# Patient Record
(demographics unavailable — no encounter records)

---

## 2024-11-18 NOTE — PHYSICAL EXAM
[Normal Appearance] : normal appearance [Well Groomed] : well groomed [General Appearance - In No Acute Distress] : no acute distress [Edema] : no peripheral edema [Respiration, Rhythm And Depth] : normal respiratory rhythm and effort [Exaggerated Use Of Accessory Muscles For Inspiration] : no accessory muscle use [] : no rash [Affect] : the affect was normal [Mood] : the mood was normal [de-identified] : 16 f velarde in place [de-identified] : in wheelchair [de-identified] : lethargic

## 2024-11-18 NOTE — ASSESSMENT
[FreeTextEntry1] : 1- leg bag and gravit bag discussed and given to change at [facility] 2- to cont rehab 3- rtc for cath change and UDS same michael - kenji if more active wht PT/rehab

## 2024-11-18 NOTE — HISTORY OF PRESENT ILLNESS
[FreeTextEntry1] : Hosp Course ( nov 12, d/c) CT Head w/wo IV contrast and MR Head+C-spine+T-spine showed no acute findings. Noted with leukocytosis and left shift, CT A/P with IV contrast showed sacral abscess - too small to drain per Interventional Radiology. Infectious Disease was consulted, patient treated with IV Zosyn, will transition to IV ertapenem upon discharge until 11/20/24. Midline IV placed 11/09/24. Psych and neurology consulted for encephalopathy, no acute intervention - felt her decline is not related to MS or a mood disorder. Noted with YANET, secondary to urinary retention/obstructive uropathy which improved with Cole placement. VA Duplex lower ext. vein scan unremarkable. Patient failed TOV, will be discharged with indwelling urinary catheter in place. PT evaluated patient and recommended [facility]. Discharge plan with medication reconciliation discussed with attending Dr. Castillo. Patient is medically cleared for discharge to [facility] with IV antibiotics and indwelling urinary catheter.  Nov 2024: creat 1.16, urine and blood cx : negative CT SCAN (+): 3 mm left renal stone   currently in Rehab ( now one week ) here with  for possibley 3-4 weeks was INC  wiht frequency increased for past 6-8 week before hosp , diapper for one year wiht bedside commode  bowel habits wiht INC -BM now in diaper and sacral wound be treated with visint nurse  admits to dec fluid intake

## 2025-01-02 NOTE — PHYSICAL EXAM
[General Appearance - Alert] : alert [General Appearance - In No Acute Distress] : in no acute distress [Auscultation Breath Sounds / Voice Sounds] : lungs were clear to auscultation bilaterally [Heart Rate And Rhythm] : heart rate was normal and rhythm regular [Heart Sounds] : normal S1 and S2 [Heart Sounds Gallop] : no gallops [Murmurs] : no murmurs [Heart Sounds Pericardial Friction Rub] : no pericardial rub [Bowel Sounds] : normal bowel sounds [Abdomen Soft] : soft [Abdomen Tenderness] : non-tender [] : no hepato-splenomegaly [Abdomen Mass (___ Cm)] : no abdominal mass palpated [Costovertebral Angle Tenderness] : no CVA tenderness [Musculoskeletal - Swelling] : no joint swelling [Nail Clubbing] : no clubbing  or cyanosis of the fingernails [Motor Tone] : muscle strength and tone were normal [Deep Tendon Reflexes (DTR)] : deep tendon reflexes were 2+ and symmetric [Sensation] : the sensory exam was normal to light touch and pinprick [No Focal Deficits] : no focal deficits [Oriented To Time, Place, And Person] : oriented to person, place, and time [Affect] : the affect was normal

## 2025-01-02 NOTE — ASSESSMENT
[FreeTextEntry1] : Ms. Jadyn Giles Is a 64-year-old female who is being seen in ID clinic after recent hospitalization in November 2024.  Patient completed antibiotic course on 11/20/2024.  Had a pelvic abscess noted on CT scan in the hospital without drainage.  Will order repeat CT abdomen/pelvis to assess for resolution.  Advised for aggressive wound care.  Patient with nonspecific general malaise symptoms today reportedly for a few weeks.  No fevers or chills.  Will obtain CBC, CMP, blood cultures x 2 for surveillance.  No indication for antibiotics today.  Advised to monitor for fever, chills, worsening other symptoms..  Advised to call clinic if these develop.  Low threshold for hospitalization if patient becomes altered with worsening signs of infection.  Jann Kate MD Division of Infectious Diseases

## 2025-01-02 NOTE — HISTORY OF PRESENT ILLNESS
[FreeTextEntry1] : Ms. Jadyn Giles Is a 64-year-old female who is being seen in ID clinic after recent hospitalization in November 2024.  Patient was discharged with a plan of 14 days of antibiotics consisting of ertapenem at discharge, completed course on 11/20/2024.    Since discharge, patient patient initially was feeling better however now reports 3 weeks of general malaise.  No other systemic symptoms such as fever, chills.  Denies any other localizing symptoms aside from an occasional cough.  Patient currently remains in rehab.  Does undergo regular wound care for sacral wound.  Has been also reports that patient had a positive MRSA screen recently.  States did receive antibiotics however records are unclear.

## 2025-04-01 NOTE — DISCUSSION/SUMMARY
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 #3 HTN- c/w amlodipine 10mg, lisinopril 40mg, metoprolol 100mg bid #4 HLD- c/w ezetimibe 10mg #5 DM- on metformin 1000/500mg #6 Neuro- MS mostly bedbound and wheelchair with assistance, new medication pending, gabapentin, autonomic dysfunction may be contributing as well, compression stockings, ID appt.  #7 Anxiety- on escitalopram 20mg, Glucerna #8 ID- bed sore regularly evaluated and treatment. #9 GI- GERD on omeprazole [EKG obtained to assist in diagnosis and management of assessed problem(s)] : EKG obtained to assist in diagnosis and management of assessed problem(s)

## 2025-04-01 NOTE — HISTORY OF PRESENT ILLNESS
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 ? new med for MS. Declining and several hospitalizations now in nursing home. DNR

## 2025-04-01 NOTE — PHYSICAL EXAM
[Well Developed] : well developed [Well Nourished] : well nourished [No Acute Distress] : no acute distress [Normal Conjunctiva] : normal conjunctiva [Normal Venous Pressure] : normal venous pressure [No Carotid Bruit] : no carotid bruit [Normal S1, S2] : normal S1, S2 [No Murmur] : no murmur [No Rub] : no rub [No Gallop] : no gallop [Clear Lung Fields] : clear lung fields [Good Air Entry] : good air entry [No Respiratory Distress] : no respiratory distress  [Soft] : abdomen soft [Non Tender] : non-tender [No Masses/organomegaly] : no masses/organomegaly [Normal Bowel Sounds] : normal bowel sounds [No Edema] : no edema [No Cyanosis] : no cyanosis [No Clubbing] : no clubbing [No Varicosities] : no varicosities [No Rash] : no rash [No Skin Lesions] : no skin lesions [Moves all extremities] : moves all extremities [No Focal Deficits] : no focal deficits [Normal Speech] : normal speech [Alert and Oriented] : alert and oriented [Normal memory] : normal memory [de-identified] : wheelchair

## 2025-04-01 NOTE — PHYSICAL EXAM
[Well Developed] : well developed [Well Nourished] : well nourished [No Acute Distress] : no acute distress [Normal Conjunctiva] : normal conjunctiva [Normal Venous Pressure] : normal venous pressure [No Carotid Bruit] : no carotid bruit [Normal S1, S2] : normal S1, S2 [No Murmur] : no murmur [No Rub] : no rub [No Gallop] : no gallop [Clear Lung Fields] : clear lung fields [Good Air Entry] : good air entry [No Respiratory Distress] : no respiratory distress  [Soft] : abdomen soft [Non Tender] : non-tender [No Masses/organomegaly] : no masses/organomegaly [Normal Bowel Sounds] : normal bowel sounds [No Edema] : no edema [No Cyanosis] : no cyanosis [No Clubbing] : no clubbing [No Varicosities] : no varicosities [No Rash] : no rash [No Skin Lesions] : no skin lesions [Moves all extremities] : moves all extremities [No Focal Deficits] : no focal deficits [Normal Speech] : normal speech [Alert and Oriented] : alert and oriented [Normal memory] : normal memory [de-identified] : wheelchair

## 2025-04-30 NOTE — ASSESSMENT
[FreeTextEntry1] : Pressure injury of sacral region, stage 4 (707.03,707.24) (L89.154) 7-29-22: Pt accompanied by  Pt has home care in place.  also changes wound dressing Pt here for stage 4 sacral wound which has been taken care of at [facility] and was almost healed but got bigger with recent hospitalization Patient's  reports small amount of drainage on the bandage No drainage from leg On exam: Rt lower leg with scar and hypopigmented tissue evidence of recent epithelialization. No open wound Sacrum: wound with periwound maceration and epiboly with granulation tissue centrally No s/s of infection Chemical cauterization with silver nitrate to sacral wound  8-24-22: Pt here for f/u Accompanied by  who does wound care No new complaints.  has concerns that buttocks breaking down. Pt incontinent On exam: faint hyperpigmentation to BL buttocks sacral wound clean, appears smaller. epiboly present silver nitrate to epiboly No s/s of infection underminig 12:00 - 6:00 1cm, 9:00 0.5cm  9-15-22 Patient here for f/u of sacral wound, accompanied by . - DTI developing in R lateral malleolar and B/L upper buttock/lower sacral regions - limited to blanching lividity at this point. Discussion addressing importance of offloading pressure from these regions was had with the patient and her . Given her sleeping position (supine), the possibility of ordering a Group 2 hospital mattress was discussed, but they will defer for now. Alternatively, patient's  was encouraged to change her position (alternating between lying on her sides) every few hours at least with support of a pillow. - Sacral wound: Good granulation tissue present in wound base, overlying sacral bone (no exposure). No local or systemic signs/symptoms of infection. No purulent discharge, no blanching erythema, no malodor, no crepitus or bullae formation. Excisional debridement of wound slough and non-viable/necrotic tissue was performed to a maximum depth of 1.2 cm into the muscular/fascial layer. Once debrided, the wound base appeared healthy - good granulation tissue present, well vascularized, without residual macroscopic necrotic debris. There was some persistent oozing from the debridement area, and silver nitrate was applied which achieved hemostasis.  10-14-22 Patient here for f/u of sacral wound, accompanied by . - Sacral Stage IV pressure ulcer. Excisional debridement of wound slough and non-viable/necrotic tissue was performed to a maximum depth of 1 cm into the muscular/fascial layer. The rim of calloused skin at the wound edge was sharply debrided as well. Once debrided, the wound base appeared healthy - good granulation tissue present, well vascularized, without residual macroscopic necrotic debris.  Given the increased undermining, likely due to sheering effects, the patient and her  are interested in obtaining a Group II mattress, which will be ordered.  Given the depth and increased undermining, will order a VAC and VNS. Wound is clean and amenable to VAC therapy.  Patient and her  are amenable to reaching out to Social Work to see about arranging help for the home. - R lateral malleolar previously suspected DTI: Site inspected, minimal erythema over malleolus which blanches. No local or systemic signs/symptoms of infection. No purulent discharge, no blanching erythema, no malodor, no crepitus or bullae formation.  10-28-22 Pt presenting for f/u of sacral Stage IV PI, accompanied by her . - Stage IV sacral PI: Wound site without any necrotic debris or slough necessitating excisional debridement. Mechanical debridement performed for scant slough. Base is clean, good granulation tissue, minimal maceration of the immediate wang-wound skin. Patient's  advised that he can use Tegaderms to re-seal the VAC dressing when necessary (samples given), and also that the VNS team should call into the [facility] office for guidance if necessary. Until next VNS visit, current dressing changes should continue. - R lateral malleolus site: No further lividity of the site, only some hyperpigmentation remains. Continue offloading site.  11-11-22: Pt here for f/u Accompanied by  No new complaints Pt has Nurse 3x/week and has PT 3x/week Pt saw SW and deciding on plan and need Pt received group 2 bed yesterday On exam: sacral wound with improvement- less undermining and depth. Periwound maceration. No s/s of infection. s/p excisional debridement of wound rim (SQ)  1-13-23: Pt here for f/u Accompanied by  No new complaints. Pt recently hospitalized for COVID and UTI Still with home crae 3x/week and PT to restart next week On exam: sacral wound: still with undermining. scant slough, s/p excisional debridement  2-10-23: Pt here for f/u Accompanied by  No new complaints Home care 3x/week Pt does not want vac anymore On exam: sacral wound: periwound scant slough. No s/s of infection. s/p excisional debridement   03/24/2023 Pt here for f/u Accompanied by  No new complaints Home care 3x/week Pt with Group 2 mattress - needs for offloading secondary to not being able to self position due to multiple sclerosis Using Prostat at home On exam: sacral wound: periwound scant slough. undermining still present. No s/s of infection. s/p excisional debridement   4/21/23 Patient here for follow up of Sacral Pressure injury Stage 4 accompanied by her  no new complaints Patient is using Juveen as a supplement Patient has a Group 2 mattress and Roho cushion for her wheelchair On exam: Wound improving, wound edges macerated, scant slough, hypergranulation present in the wound bed s/p excisional debridement Cautery with silver nitrate to hypergran Undermining from 12 - 3 pm at 3cm  5-12-23: Pt here for f/u Accompanied by  No new complaints Nurse 3x/week Cont with George On exam: sacral wound: periwound maceration, scant slough, less undermining. no s/s of infection s/p excisional debridement  6-9-23: Pt here for f/u Accompanied by lauren No new complaints Has nurse 3x/week cont george On exam: sacral wound: periwound maceration and scant slough at wound edges with hypergran centrally. s/p excisional debridement and silver nitrate to hypergran   7-26-23: Pt here for f/u Accompanied by  Celina laura right buttock has a developing wound has nurse 3x/week On exam: sacral wound: periwound maceration and scant slough at wound edges with hypergran centrally. s/p excisional debridement and silver nitrate to hypergran NEW Rt buttock: healed blister in setting of MAD  9/20/23 Patient presents accompanied by her  for sacral wound follow up. has home care taking protein supplement On exam: Sacral wound with hypergranulation centrally. s/p excisional debridement No s/s of infection. s/p Silver Nitrate Cautery performed. Washed with soap and water. Apply Mesalt packing. Zinc oxide to periwound Adhesive foam dressing. Change three times a week and as needed by family.  10-18-23: Pt here for f/u Accompanied by  No new complaints On exam:  sacral wound:  scant slough. opening appears smaller. undermining of 4.5 at 3:00  No s/s of infection. s/p excisional debridement    11/15/23 Pt here for f/u of sacral wound. Accompanied by . Has home nursing 3x/wk No new complaints, no fevers. On exam: Sacral wound is small, macerated periwound skin and slough, some hypergranular tissue in tract. Undermines 5cm at 3oclock. Excisional debridement of slough and nonviable skin performed No evidence of infectoin  12/27/23 Patient fell last week in house, went over walker, sustained bruising to left lower abdomen and left leg, area are soft, but does take aspirin daily, advised to call primary and ask if it should be held for few days Sacral wound became small on exterior, difficult to pack, discussed that undermining is present fro 9-11 o'clock  and to facilitate packing wound needs to be opened in that area, pt. consented, area injected, opened, noted to have granuloma with straight depth, visable, treated with silver nitrate, s/p excisional debridement , pt. tolerated well  1-17-24: Pt here for f/u Accompanied by   says wound is draining copious amounts of fluid.  Pt with fever and feeling unwell 1 week ago.  Saw PMD. Elevated WBC.  No source.  Started on cipro.  Today day 7/7 of cipro.  No fevers but still feel unwell.  On exam:  sacral wound: periwound maceration.  depth 0.8cm.  undermining of 5 at 2:00.  scant slough.  serosanguinous drainage.  s/p excisional debridement  Cx taken.   02/14/2024 Pt here for f/u Accompanied by   says wound is still draining fluid.  last cultures positive for staph,  pt completed bactrim On exam:  sacral wound: periwound callused.  depth 0.8cm.  undermining of 6cm at 2:00.  scant slough.  serosanguinous drainage.  s/p excisional debridement  of muscle bone palpable but not exposed.  xray will be ordered to r/o OM  3-13-24: Pt here for f/u Accompanied by  No new complaints xray from 3/3/24: limited but no OM, if concerns recommend further studies has nurse 3x/week  On exam: sacral wound: 1cm deep, 2cm at 12:00, 5cm at 3:00. rough bone exposed within undermining at 2-3:00. slough present. s/p excisional debridement  No s/s of infection.   4-10-24: Pt here for f/u Accompanied by  Saw ID-- no abx necessary MRI pending-- scheduled for this month ([place]) Has nurse 3x/week No new complaints  said redness around wound wasnt there before and could be just from transport On exam: sacral wound: periwound callus with maceration.  periwound blanchable erythema.  No periwound warmth, tenderness, induration, fluctuance or crepitus.  scant slough at wound edges.  s/p excisional debridement  bone no longer exposed in undermining  5-8-24: Pt here for f/u Accompanied by  No new complaints MRI pelvis reviewed. with pt and  Pt just got an IV infusion that lasted 6 hrs in a chair on 5-6-24.  Pt was not using cushion On exam: sacral wound: periowund callus with scant slough at wound sides. No s/s of infection. s/p excisional debridement  NEW Rt buttock DETI.  No open skin. No s/s of infection.   6/5/24 Patient here for follow up, accompanied by . Has home nursing 3x/wk. Has been using Roho cushion. No fevers On exam: NEW Left shin injury - appears to have an old blood blister over a deeper resolving hematoma. Outer layer removed via mechanical debridement. No surrounding erythema or signs of infection Sacral wound: periwound callus with moderate drainage, scant slough at wound edges. No evidence of infection. Excisional debridement performed  7-10-24: Pt here for f/u Accompanied by  No new complaints Does to want to consider EO2 at this time due to other medical conditions complicating things Incontinent of stool, continent of urine On exam: sacral wound: slough to wound edges and superfical wound in periowund.  No s/s of infection. s/p excisional debridement  Left shin:  eschar. No s/s of infection. s/p excisional debridement   08/07/2024 Pt here for f/u Accompanied by  patient had recent fall at home, bruises on bilateral shins, no wounds, patient didn't go to ER after the fall, denies pain or decreased range of motion No new complaints as per homecare nurse tunnel improving Not interested in partaking in Altrazeal pressure injury study at this time  On exam: sacral wound: slough to wound edge, tunnel improved 12-2oc- 3.2cm,  superfical wound in periowund healed.  No s/s of infection. s/p excisional debridement Left shin: wound healed No s/s of infection.   9-4-24: Pt here for f/u Accompanied by  No new complaints on George and Ensure and glucerna but as per  not eating much and losing weight Pt also having problems swallowing on occ.  This is recent On exam: sacral wound: periwound callus.  scant slough on edges.  No s/s of infection. s/p excisional debridement Undermining less.  Silver nitrate used for hemostasis.   10-2-24: Pt here for f/u Accompanied by  No new complaints re wound Has nurse 3x/week  Plan for swallow study as per PMD and dental eval.  Hasn't gained weight but hasn't dropped.  Tolerating protein shakes  On exam: sacral wound: periwound maceration.  granulation tissue with epiboly.  s/p silver nitrate to epiboly and area of undermining.  s/p mechanical debridement .    4-30-25: Pt here for f/u Accompanied by  S/p Mutiple hospitalizations.  Here for sacral wound and BLE wounds On exam: sacrum: bone exposed with slough at edges.  undermining present. s/p excisional debridement No s/s of infection.  rt medial distal thigh wound:  necrotic epidermis/dermis with subq exposed. s/p mechanical debridement s/p excisional debridement of epidermis/dermis left lateral proximal leg:  eschar and slough s/p excisional debridement  No s/s of infection.

## 2025-04-30 NOTE — REVIEW OF SYSTEMS
[Skin Wound] : skin wound [Negative] : Psychiatric [FreeTextEntry9] : non ambulatory [de-identified] : MS, paraplegia

## 2025-04-30 NOTE — REASON FOR VISIT
[Follow-Up: _____] : a [unfilled] follow-up visit [Spouse] : spouse [FreeTextEntry1] : Sacral decubitus ulcer (Stage 4)

## 2025-04-30 NOTE — PLAN
[FreeTextEntry1] : 7-29-22 wash with soap and water Plan-Christa/aquacel/foam 3x/week Nursing orders given f/u 2-3 weeks  8-24-22: wash with soap and water Plan-Christa/aquacel/foam 3x/week Barrier to BL buttocks F/u 2-3 weeks  9-16-22 - Cleanse with soap and water -  instructed as to the proper order of dressing application (Christa first directly to the wound bed, followed by Aquacel and then adhesive foam - Apply skin moisturizer to RLE to diminish pruritis - Continue dressing changes every other day - Frequent alteration of lying and sitting positions, will defer placing order of hospital mattress at this time given they would like to remain sleeping in same bed. - Follow up in the Wound Care clinic in 3 weeks.   10-14-22 - Christa, Aquacel roping, adhesive gauze bandage to sacral wound daily. - Offload right ankle (no DTI on exam today) - Continue wound cleansing with soap and water - VNS and VAC ordered.  Once Nusring in SW can come in and evaluate for further care/assistance - Home care supplies ordered - Follow up in the Wound Care clinic in 2 weeks.   10-28-22 - Continue Christa, followed by Aquacel roping and adhesive gauze dressing, until VNS returns with wound VAC - Continue offloading of R lateral malleolus - The patient was counseled to cleanse their wound(s) with Mann & Mann unscented baby shampoo or Dove sensitive soap, rinse with clean water and then re-dress.  - Await approval of Group II mattress - Follow up in the Wound Care clinic in 2 weeks.   11-11-22: Plan: cont vac 3x/week.  if wound get stoo small for VAC go back to christa/aquacel/foam 3x/week Nursing orders given f/u 3-4 weeks   1-13-23: Plan: christa/aquacel/foam applied today.  and cont till vac can be placed by RN on Monday Nursing orders given f/u 3-4   2-10-23: Plan: aquacel ag rope/foam dressing 3x/week and prn Nursing orders given f/u 4 weeks   03/24/2023 Plan: irrigate with 1/4 strength dakins christa followed by aquacel ag rope/foam dressing 3x/week and prn Nursing orders given f/u 4 weeks   4/21/23 Plan: Cleanse wound with soap and water Christa/Aquacel rope/Foam Change 3 times a week Nursing orders given Follow up in 3-4 weeks  5-12-12: Plan: Cleanse wound with soap and water Christa/Aquacel rope/Foam,  zinc or cavilon to periwound Change 3 times a week and prn Nursing orders given cont George cont offloading Follow up in 3-4 weeks  6-9-23: Plan: Cleanse wound with soap and water Christa/Aquacel rope/Foam,  zinc or cavilon to periwound Change 3 times a week and prn Nursing orders given cont George cont offloading Follow up in 3-4 weeks  7-26-23: Plan: Cleanse wound with soap and water Christa/Aquacel rope/Foam,  zinc or cavilon to periwound Change 3 times a week and prn NEW MAD: Zinc to buttocks BID Nursing orders given cont George cont offloading Follow up in 4 weeks  9/20/23 Plan: Cleanse wound with soap and water. Mesalt packing/Adhesive Foam,  Zinc or cavilon to periwound Change 3 times a week and prn. Nursing orders given. cont George cont offloading Follow up in 3- 4 weeks  10-18-23: Plan: Cleanse wound with soap and water. Mesalt packing/Adhesive Foam,  Zinc or cavilon to periwound Change 3 times a week and prn. Nursing orders given. cont George cont offloading Follow up in 4 weeks  11/15/23 Plan: Cleanse wound w soap and water Pack with Aquacel Ag rope (not alginate) cavilon to periwound Adhesive foam dressing Zinc barrier cream around outside of dressing Change 3 times a week and prn. Nursing orders given. cont George cont offloading Follow up in 3-4 weeks  12/27/23 Plan - sacral - after flushing, pack with aquacel, cavilon wang wound/foam over offload area adequate nutrition, george coupons and samples given orders for nurse follow up 2 weeks contact primary regarding aspirin  1-7-24: Plan: wash with soap and water drawtex/foam/zinc to periwound.  Nursing orders given f/u cx The patient was counseled as to the signs and symptoms of infection, and instructed in the event they suspect new or worsening infection or if the patient is significantly ill (fever, altered mental status, etc.), to present to the nearest ED.  f/u 3-4 weeks   02/14/2024 Plan: wash with soap and water aquacel/drawtex/foam/zinc to periwound.  Nursing orders given xray of sacrum to be ordered f/u 4 weeks   3-13-2$: Plan: sacrum:  clinically OM based on exam (NEW) wash with soap and water, irrigate with vashe aquacel/drawtex/foam/zinc to periwound.  Nursing orders given will order MRI of sacrum (to be coordinated with other MRI's pt needs). Rx given Rec ID consult.  MD access form with number to call to schedule given f/u 3-4 weeks   4-10-24: Plan: sacrum: wash with soap and water, irrigate with vashe aquacel/drawtex/foam/OTC antifungal (x1 weekto periowund)/zinc to periwound.  Nursing orders given f/u 3-4 weeks   5-8-24: Plan: sacral wound:  wash with soap and water, irrigate with vashe aquacel AG/foam/zinc to periwound.  NEW RT buttock DTI-  offload , zinc if skin opens Nursing orders given cushion to chair at all times f/u 3-4 weeks   6/5/24 Sacral wound: Wash with soap and water, irrigate w vashe Pack with Aquacel AG, adhesive foam. Zinc to periwound L shin hematoma - Cavilon Nursing orders provided Considering topical oxygen therapy f/u in 3-4 weeks  7-10-24: Plan: d/w pt Altrazeal Pressure Injury study.  Consent given for review. Sacral wound: Wash with soap and water, irrigate w vashe Pack with Aquacel AG, adhesive foam. Zinc to periwound Left shin:  honey and foam  Nursing orders given  f/u 3-4 weeks   08/07/2024 Plan: Sacral wound: Wash with soap and water, irrigate w vashe Pack with Aquacel AG, adhesive foam. Zinc to periwound LLE wound HEALED Nursing orders given  f/u 3-4 weeks   9-4-24: Plan: sacral wound: cont current tx Wash with soap and water, irrigate w vashe Pack with Aquacel AG, adhesive foam. Zinc to periwound Nursing orders given Rec nutrition consult.   to speak to PMD.  Also rec f/u for swallowing issues with PMD and Neuro.  Pt as appts next  week.  offload f/u 3-4 weeks   10-2-24: Plan: sacral wound wash with soap and water cont aquacel ag/foam.  zinc to periwound f/u speech and swallow and dentist and PMD Nursing orders given  f/u 3-4 weeks   4-30-25: Plan: sacral wound vashe wet to dry applied today with foam.  at facility wash with soap and water cleanse with vashe or 1/4 strength dakins cont NPWT at NH 3x/week-- white foam to undermining and adaptic over bone BLE: wash with soap and water honey/foam daily Nursing orders given  f/u in 4 weeks

## 2025-04-30 NOTE — PHYSICAL EXAM
[Skin Ulcer] : ulcer [Alert] : alert [Oriented to Person] : oriented to person [Oriented to Place] : oriented to place [Oriented to Time] : oriented to time [Calm] : calm [Please See PDF for Tissue Analytics] : Please See PDF for Tissue Analytics. [1+] : left 1+ [Ankle Swelling (On Exam)] : present [Ankle Swelling On The Right] : mild [de-identified] : NAD [de-identified] : AT [de-identified] : supple [de-identified] : equal chest rise [FreeTextEntry1] : Extremities are warm,  [de-identified] : velarde [de-identified] : B/L LE muscle wasting [de-identified] : See TA [de-identified] : .

## 2025-04-30 NOTE — HISTORY OF PRESENT ILLNESS
[FreeTextEntry1] : Skinned right leg in 2019 after collapsing on the front steps of her house. Patient ended up in hospital unable to walk due to spinal tumor. Patient had been using Silvadene ointment after the accident, is now only using an adhesive bandage.   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 She went to a  center in [place], had used VAC. After 3 years the wound was down to the size of a pea, 1 more year later it was covered by a scab. In June 2022 Patient went back into the hospital and it reopened and got bigger.   Patient packing sacrum with aquacel and covering with adhesive foam  Patient also stated that between [id number] she had 3 spontaneous tendon ruptures in both her arms, and right calf. 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 She is accompanied by her , who serves as primary caregiver.  They report that her RLE wound has completely healed, but also that the patient feels pruritis at the site and repeatedly scratches at it. To mitigate this, the  places an adhesive foam bandage to the area.  They report that her sacral wound appears stable. Patient denies any fever or chills, denies any purulent drainage or malodor. There is some clear yellow drainage from the sacral site. The  reports that he's been applying the Aquacel to the base of the wound, which he then covers with Michelle.  The  also pointed out some areas of skin discoloration of the R lateral malleolus and B/L upper buttocks/lower sacrum.  The patient sleeps in bed with her . They have offloading cushioning for her [employer] wheelchair, but do not have any level of off-loading hospital mattress/bed.  Patient also had a fall roughly one week prior, landing on the back of her LUE and hitting the her forehead as well. She reports soreness and swelling of her LUE, but denies any paresthesia or weakness of her LUE. No loss of consciousness, no nausea/vomiting, no memory loss or unexplained changes in temperament.   10-14-22 Patient presenting for follow-up of Stage IV sacral pressure wound, accompanied by . He's been changing her dressing daily, using Michelle, Aquacel and adhesive foam dressings (which he's been purchasing online). Patient denies any fever or chills, denies any purulent drainage or malodor. Patient denies any pain.  Regarding the question of a Group II mattress, the  and patient are amenable to ordering one at this time. He was also explaining that he's been stretched a bit thin, taking care of his wife on his own and working (as a ) at the same time.   undermine 12-3 o clock @12 - 2cm @ 3- 2.8 cm  10-28-22 Patient presenting for follow-up of Stage IV sacral pressure wound, accompanied by . In the interim, they have gotten VNS (coming M/W/F to apply VAC), and VNS has requested Social Work to evaluate (regarding care burden on ), and Group II mattress approval is in process. They do report some difficulties with the VAC system - it has lost seal multiple times, secondary to patient's incontinence. The original VAC dressing system came with some replacement coverings, which allowed the  to fix the leak - but the new system they are using does not have any supplemental dressings. VNS is looking into getting a different dressing system to address the issue. When the VAC fails, the  has resorted to packing the wound with Michelle, then Aquacel roping and then adhesive gauze.   4-30-35: Pt presents for eval of sacral wound. Pt s/p multiple hospitalizations.  Tx'ed for OM. Pt currently resides in NH.  Pt last seen at [facility] and followed by wound care--Sacral wound tx'ed with a Vac with santyl and BLE wounds  
NPO, with non-oral nutrition/hydration/medications. Please allow ice chip trials SPARINGLY in conjunction with good oral hygiene, one at a time, with 100% Assistance, for comfort and therapeutic purposes

## 2025-05-28 NOTE — PLAN
[FreeTextEntry1] : 7-29-22 wash with soap and water Plan-Christa/aquacel/foam 3x/week Nursing orders given f/u 2-3 weeks  8-24-22: wash with soap and water Plan-Christa/aquacel/foam 3x/week Barrier to BL buttocks F/u 2-3 weeks  9-16-22 - Cleanse with soap and water -  instructed as to the proper order of dressing application (Christa first directly to the wound bed, followed by Aquacel and then adhesive foam - Apply skin moisturizer to RLE to diminish pruritis - Continue dressing changes every other day - Frequent alteration of lying and sitting positions, will defer placing order of hospital mattress at this time given they would like to remain sleeping in same bed. - Follow up in the Wound Care clinic in 3 weeks.   10-14-22 - Christa, Aquacel roping, adhesive gauze bandage to sacral wound daily. - Offload right ankle (no DTI on exam today) - Continue wound cleansing with soap and water - VNS and VAC ordered.  Once Nusring in SW can come in and evaluate for further care/assistance - Home care supplies ordered - Follow up in the Wound Care clinic in 2 weeks.   10-28-22 - Continue Chrisat, followed by Aquacel roping and adhesive gauze dressing, until VNS returns with wound VAC - Continue offloading of R lateral malleolus - The patient was counseled to cleanse their wound(s) with Mann & Mann unscented baby shampoo or Dove sensitive soap, rinse with clean water and then re-dress.  - Await approval of Group II mattress - Follow up in the Wound Care clinic in 2 weeks.   11-11-22: Plan: cont vac 3x/week.  if wound get stoo small for VAC go back to christa/aquacel/foam 3x/week Nursing orders given f/u 3-4 weeks   1-13-23: Plan: christa/aquacel/foam applied today.  and cont till vac can be placed by RN on Monday Nursing orders given f/u 3-4   2-10-23: Plan: aquacel ag rope/foam dressing 3x/week and prn Nursing orders given f/u 4 weeks   03/24/2023 Plan: irrigate with 1/4 strength dakins christa followed by aquacel ag rope/foam dressing 3x/week and prn Nursing orders given f/u 4 weeks   4/21/23 Plan: Cleanse wound with soap and water Christa/Aquacel rope/Foam Change 3 times a week Nursing orders given Follow up in 3-4 weeks  5-12-12: Plan: Cleanse wound with soap and water Christa/Aquacel rope/Foam,  zinc or cavilon to periwound Change 3 times a week and prn Nursing orders given cont George cont offloading Follow up in 3-4 weeks  6-9-23: Plan: Cleanse wound with soap and water Christa/Aquacel rope/Foam,  zinc or cavilon to periwound Change 3 times a week and prn Nursing orders given cont George cont offloading Follow up in 3-4 weeks  7-26-23: Plan: Cleanse wound with soap and water Christa/Aquacel rope/Foam,  zinc or cavilon to periwound Change 3 times a week and prn NEW MAD: Zinc to buttocks BID Nursing orders given cont George cont offloading Follow up in 4 weeks  9/20/23 Plan: Cleanse wound with soap and water. Mesalt packing/Adhesive Foam,  Zinc or cavilon to periwound Change 3 times a week and prn. Nursing orders given. cont George cont offloading Follow up in 3- 4 weeks  10-18-23: Plan: Cleanse wound with soap and water. Mesalt packing/Adhesive Foam,  Zinc or cavilon to periwound Change 3 times a week and prn. Nursing orders given. cont George cont offloading Follow up in 4 weeks  11/15/23 Plan: Cleanse wound w soap and water Pack with Aquacel Ag rope (not alginate) cavilon to periwound Adhesive foam dressing Zinc barrier cream around outside of dressing Change 3 times a week and prn. Nursing orders given. cont George cont offloading Follow up in 3-4 weeks  12/27/23 Plan - sacral - after flushing, pack with aquacel, cavilon wang wound/foam over offload area adequate nutrition, george coupons and samples given orders for nurse follow up 2 weeks contact primary regarding aspirin  1-7-24: Plan: wash with soap and water drawtex/foam/zinc to periwound.  Nursing orders given f/u cx The patient was counseled as to the signs and symptoms of infection, and instructed in the event they suspect new or worsening infection or if the patient is significantly ill (fever, altered mental status, etc.), to present to the nearest ED.  f/u 3-4 weeks   02/14/2024 Plan: wash with soap and water aquacel/drawtex/foam/zinc to periwound.  Nursing orders given xray of sacrum to be ordered f/u 4 weeks   3-13-2$: Plan: sacrum:  clinically OM based on exam (NEW) wash with soap and water, irrigate with vashe aquacel/drawtex/foam/zinc to periwound.  Nursing orders given will order MRI of sacrum (to be coordinated with other MRI's pt needs). Rx given Rec ID consult.  MD access form with number to call to schedule given f/u 3-4 weeks   4-10-24: Plan: sacrum: wash with soap and water, irrigate with vashe aquacel/drawtex/foam/OTC antifungal (x1 weekto periowund)/zinc to periwound.  Nursing orders given f/u 3-4 weeks   5-8-24: Plan: sacral wound:  wash with soap and water, irrigate with vashe aquacel AG/foam/zinc to periwound.  NEW RT buttock DTI-  offload , zinc if skin opens Nursing orders given cushion to chair at all times f/u 3-4 weeks   6/5/24 Sacral wound: Wash with soap and water, irrigate w vashe Pack with Aquacel AG, adhesive foam. Zinc to periwound L shin hematoma - Cavilon Nursing orders provided Considering topical oxygen therapy f/u in 3-4 weeks  7-10-24: Plan: d/w pt Altrazeal Pressure Injury study.  Consent given for review. Sacral wound: Wash with soap and water, irrigate w vashe Pack with Aquacel AG, adhesive foam. Zinc to periwound Left shin:  honey and foam  Nursing orders given  f/u 3-4 weeks   08/07/2024 Plan: Sacral wound: Wash with soap and water, irrigate w vashe Pack with Aquacel AG, adhesive foam. Zinc to periwound LLE wound HEALED Nursing orders given  f/u 3-4 weeks   9-4-24: Plan: sacral wound: cont current tx Wash with soap and water, irrigate w vashe Pack with Aquacel AG, adhesive foam. Zinc to periwound Nursing orders given Rec nutrition consult.   to speak to PMD.  Also rec f/u for swallowing issues with PMD and Neuro.  Pt as appts next  week.  offload f/u 3-4 weeks   10-2-24: Plan: sacral wound wash with soap and water cont aquacel ag/foam.  zinc to periwound f/u speech and swallow and dentist and PMD Nursing orders given  f/u 3-4 weeks   4-30-25: Plan: sacral wound vashe wet to dry applied today with foam.  at facility wash with soap and water cleanse with vashe or 1/4 strength dakins cont NPWT at NH 3x/week-- white foam to undermining and adaptic over bone BLE: wash with soap and water honey/foam daily Nursing orders given  f/u in 4 weeks   5-28-25: Plan: sacral wound vashe wet to dry applied today with foam.  at facility wash with soap and water cleanse with vashe or 1/4 strength dakins cont NPWT at NH 3x/week-- white foam to undermining and adaptic over bone LLE and NEW Rt  lateral leg wound wash with soap and water honey/foam daily NEW Rt chest wall skin lesion:  wash with soap and water.  warm compresses BID The patient was counseled as to the signs and symptoms of infection, and instructed in the event they suspect new or worsening infection or if the patient is significantly ill (fever, altered mental status, etc.), to present to the nearest ED.  Nursing orders given f/u in 4 weeks

## 2025-05-28 NOTE — HISTORY OF PRESENT ILLNESS
[FreeTextEntry1] : Skinned right leg in 2019 after collapsing on the front steps of her house. Patient ended up in hospital unable to walk due to spinal tumor. Patient had been using Silvadene ointment after the accident, is now only using an adhesive bandage.   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 She went to a  center in [place], had used VAC. After 3 years the wound was down to the size of a pea, 1 more year later it was covered by a scab. In June 2022 Patient went back into the hospital and it reopened and got bigger.   Patient packing sacrum with aquacel and covering with adhesive foam  Patient also stated that between [id number] she had 3 spontaneous tendon ruptures in both her arms, and right calf. 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 She is accompanied by her , who serves as primary caregiver.  They report that her RLE wound has completely healed, but also that the patient feels pruritis at the site and repeatedly scratches at it. To mitigate this, the  places an adhesive foam bandage to the area.  They report that her sacral wound appears stable. Patient denies any fever or chills, denies any purulent drainage or malodor. There is some clear yellow drainage from the sacral site. The  reports that he's been applying the Aquacel to the base of the wound, which he then covers with Michelle.  The  also pointed out some areas of skin discoloration of the R lateral malleolus and B/L upper buttocks/lower sacrum.  The patient sleeps in bed with her . They have offloading cushioning for her [employer]o wheelchair, but do not have any level of off-loading hospital mattress/bed.  Patient also had a fall roughly one week prior, landing on the back of her LUE and hitting the her forehead as well. She reports soreness and swelling of her LUE, but denies any paresthesia or weakness of her LUE. No loss of consciousness, no nausea/vomiting, no memory loss or unexplained changes in temperament.   10-14-22 Patient presenting for follow-up of Stage IV sacral pressure wound, accompanied by . He's been changing her dressing daily, using Michelle, Aquacel and adhesive foam dressings (which he's been purchasing online). Patient denies any fever or chills, denies any purulent drainage or malodor. Patient denies any pain.  Regarding the question of a Group II mattress, the  and patient are amenable to ordering one at this time. He was also explaining that he's been stretched a bit thin, taking care of his wife on his own and working (as a ) at the same time.   undermine 12-3 o clock @12 - 2cm @ 3- 2.8 cm  10-28-22 Patient presenting for follow-up of Stage IV sacral pressure wound, accompanied by . In the interim, they have gotten VNS (coming M/W/F to apply VAC), and VNS has requested Social Work to evaluate (regarding care burden on ), and Group II mattress approval is in process. They do report some difficulties with the VAC system - it has lost seal multiple times, secondary to patient's incontinence. The original VAC dressing system came with some replacement coverings, which allowed the  to fix the leak - but the new system they are using does not have any supplemental dressings. VNS is looking into getting a different dressing system to address the issue. When the VAC fails, the  has resorted to packing the wound with Michelle, then Aquacel roping and then adhesive gauze.   4-30-35: Pt presents for eval of sacral wound. Pt s/p multiple hospitalizations.  Tx'ed for OM. Pt currently resides in NH.  Pt last seen at [facility] and followed by wound care--Sacral wound tx'ed with a Vac with santyl and BLE wounds

## 2025-05-28 NOTE — ASSESSMENT
[FreeTextEntry1] : Pressure injury of sacral region, stage 4 (707.03,707.24) (L89.154) 7-29-22: Pt accompanied by  Pt has home care in place.  also changes wound dressing Pt here for stage 4 sacral wound which has been taken care of at [facility] and was almost healed but got bigger with recent hospitalization Patient's  reports small amount of drainage on the bandage No drainage from leg On exam: Rt lower leg with scar and hypopigmented tissue evidence of recent epithelialization. No open wound Sacrum: wound with periwound maceration and epiboly with granulation tissue centrally No s/s of infection Chemical cauterization with silver nitrate to sacral wound  8-24-22: Pt here for f/u Accompanied by  who does wound care No new complaints.  has concerns that buttocks breaking down. Pt incontinent On exam: faint hyperpigmentation to BL buttocks sacral wound clean, appears smaller. epiboly present silver nitrate to epiboly No s/s of infection underminig 12:00 - 6:00 1cm, 9:00 0.5cm  9-15-22 Patient here for f/u of sacral wound, accompanied by . - DTI developing in R lateral malleolar and B/L upper buttock/lower sacral regions - limited to blanching lividity at this point. Discussion addressing importance of offloading pressure from these regions was had with the patient and her . Given her sleeping position (supine), the possibility of ordering a Group 2 hospital mattress was discussed, but they will defer for now. Alternatively, patient's  was encouraged to change her position (alternating between lying on her sides) every few hours at least with support of a pillow. - Sacral wound: Good granulation tissue present in wound base, overlying sacral bone (no exposure). No local or systemic signs/symptoms of infection. No purulent discharge, no blanching erythema, no malodor, no crepitus or bullae formation. Excisional debridement of wound slough and non-viable/necrotic tissue was performed to a maximum depth of 1.2 cm into the muscular/fascial layer. Once debrided, the wound base appeared healthy - good granulation tissue present, well vascularized, without residual macroscopic necrotic debris. There was some persistent oozing from the debridement area, and silver nitrate was applied which achieved hemostasis.  10-14-22 Patient here for f/u of sacral wound, accompanied by . - Sacral Stage IV pressure ulcer. Excisional debridement of wound slough and non-viable/necrotic tissue was performed to a maximum depth of 1 cm into the muscular/fascial layer. The rim of calloused skin at the wound edge was sharply debrided as well. Once debrided, the wound base appeared healthy - good granulation tissue present, well vascularized, without residual macroscopic necrotic debris.  Given the increased undermining, likely due to sheering effects, the patient and her  are interested in obtaining a Group II mattress, which will be ordered.  Given the depth and increased undermining, will order a VAC and VNS. Wound is clean and amenable to VAC therapy.  Patient and her  are amenable to reaching out to Social Work to see about arranging help for the home. - R lateral malleolar previously suspected DTI: Site inspected, minimal erythema over malleolus which blanches. No local or systemic signs/symptoms of infection. No purulent discharge, no blanching erythema, no malodor, no crepitus or bullae formation.  10-28-22 Pt presenting for f/u of sacral Stage IV PI, accompanied by her . - Stage IV sacral PI: Wound site without any necrotic debris or slough necessitating excisional debridement. Mechanical debridement performed for scant slough. Base is clean, good granulation tissue, minimal maceration of the immediate wang-wound skin. Patient's  advised that he can use Tegaderms to re-seal the VAC dressing when necessary (samples given), and also that the VNS team should call into the [facility] office for guidance if necessary. Until next VNS visit, current dressing changes should continue. - R lateral malleolus site: No further lividity of the site, only some hyperpigmentation remains. Continue offloading site.  11-11-22: Pt here for f/u Accompanied by  No new complaints Pt has Nurse 3x/week and has PT 3x/week Pt saw SW and deciding on plan and need Pt received group 2 bed yesterday On exam: sacral wound with improvement- less undermining and depth. Periwound maceration. No s/s of infection. s/p excisional debridement of wound rim (SQ)  1-13-23: Pt here for f/u Accompanied by  No new complaints. Pt recently hospitalized for COVID and UTI Still with home crae 3x/week and PT to restart next week On exam: sacral wound: still with undermining. scant slough, s/p excisional debridement  2-10-23: Pt here for f/u Accompanied by  No new complaints Home care 3x/week Pt does not want vac anymore On exam: sacral wound: periwound scant slough. No s/s of infection. s/p excisional debridement   03/24/2023 Pt here for f/u Accompanied by  No new complaints Home care 3x/week Pt with Group 2 mattress - needs for offloading secondary to not being able to self position due to multiple sclerosis Using Prostat at home On exam: sacral wound: periwound scant slough. undermining still present. No s/s of infection. s/p excisional debridement   4/21/23 Patient here for follow up of Sacral Pressure injury Stage 4 accompanied by her  no new complaints Patient is using Juveen as a supplement Patient has a Group 2 mattress and Roho cushion for her wheelchair On exam: Wound improving, wound edges macerated, scant slough, hypergranulation present in the wound bed s/p excisional debridement Cautery with silver nitrate to hypergran Undermining from 12 - 3 pm at 3cm  5-12-23: Pt here for f/u Accompanied by  No new complaints Nurse 3x/week Cont with George On exam: sacral wound: periwound maceration, scant slough, less undermining. no s/s of infection s/p excisional debridement  6-9-23: Pt here for f/u Accompanied by lauren No new complaints Has nurse 3x/week cont george On exam: sacral wound: periwound maceration and scant slough at wound edges with hypergran centrally. s/p excisional debridement and silver nitrate to hypergran   7-26-23: Pt here for f/u Accompanied by  Celina laura right buttock has a developing wound has nurse 3x/week On exam: sacral wound: periwound maceration and scant slough at wound edges with hypergran centrally. s/p excisional debridement and silver nitrate to hypergran NEW Rt buttock: healed blister in setting of MAD  9/20/23 Patient presents accompanied by her  for sacral wound follow up. has home care taking protein supplement On exam: Sacral wound with hypergranulation centrally. s/p excisional debridement No s/s of infection. s/p Silver Nitrate Cautery performed. Washed with soap and water. Apply Mesalt packing. Zinc oxide to periwound Adhesive foam dressing. Change three times a week and as needed by family.  10-18-23: Pt here for f/u Accompanied by  No new complaints On exam:  sacral wound:  scant slough. opening appears smaller. undermining of 4.5 at 3:00  No s/s of infection. s/p excisional debridement    11/15/23 Pt here for f/u of sacral wound. Accompanied by . Has home nursing 3x/wk No new complaints, no fevers. On exam: Sacral wound is small, macerated periwound skin and slough, some hypergranular tissue in tract. Undermines 5cm at 3oclock. Excisional debridement of slough and nonviable skin performed No evidence of infectoin  12/27/23 Patient fell last week in house, went over walker, sustained bruising to left lower abdomen and left leg, area are soft, but does take aspirin daily, advised to call primary and ask if it should be held for few days Sacral wound became small on exterior, difficult to pack, discussed that undermining is present fro 9-11 o'clock  and to facilitate packing wound needs to be opened in that area, pt. consented, area injected, opened, noted to have granuloma with straight depth, visable, treated with silver nitrate, s/p excisional debridement , pt. tolerated well  1-17-24: Pt here for f/u Accompanied by   says wound is draining copious amounts of fluid.  Pt with fever and feeling unwell 1 week ago.  Saw PMD. Elevated WBC.  No source.  Started on cipro.  Today day 7/7 of cipro.  No fevers but still feel unwell.  On exam:  sacral wound: periwound maceration.  depth 0.8cm.  undermining of 5 at 2:00.  scant slough.  serosanguinous drainage.  s/p excisional debridement  Cx taken.   02/14/2024 Pt here for f/u Accompanied by   says wound is still draining fluid.  last cultures positive for staph,  pt completed bactrim On exam:  sacral wound: periwound callused.  depth 0.8cm.  undermining of 6cm at 2:00.  scant slough.  serosanguinous drainage.  s/p excisional debridement  of muscle bone palpable but not exposed.  xray will be ordered to r/o OM  3-13-24: Pt here for f/u Accompanied by  No new complaints xray from 3/3/24: limited but no OM, if concerns recommend further studies has nurse 3x/week  On exam: sacral wound: 1cm deep, 2cm at 12:00, 5cm at 3:00. rough bone exposed within undermining at 2-3:00. slough present. s/p excisional debridement  No s/s of infection.   4-10-24: Pt here for f/u Accompanied by  Saw ID-- no abx necessary MRI pending-- scheduled for this month ([place]) Has nurse 3x/week No new complaints  said redness around wound wasnt there before and could be just from transport On exam: sacral wound: periwound callus with maceration.  periwound blanchable erythema.  No periwound warmth, tenderness, induration, fluctuance or crepitus.  scant slough at wound edges.  s/p excisional debridement  bone no longer exposed in undermining  5-8-24: Pt here for f/u Accompanied by  No new complaints MRI pelvis reviewed. with pt and  Pt just got an IV infusion that lasted 6 hrs in a chair on 5-6-24.  Pt was not using cushion On exam: sacral wound: periowund callus with scant slough at wound sides. No s/s of infection. s/p excisional debridement  NEW Rt buttock DETI.  No open skin. No s/s of infection.   6/5/24 Patient here for follow up, accompanied by . Has home nursing 3x/wk. Has been using Roho cushion. No fevers On exam: NEW Left shin injury - appears to have an old blood blister over a deeper resolving hematoma. Outer layer removed via mechanical debridement. No surrounding erythema or signs of infection Sacral wound: periwound callus with moderate drainage, scant slough at wound edges. No evidence of infection. Excisional debridement performed  7-10-24: Pt here for f/u Accompanied by  No new complaints Does to want to consider EO2 at this time due to other medical conditions complicating things Incontinent of stool, continent of urine On exam: sacral wound: slough to wound edges and superfical wound in periowund.  No s/s of infection. s/p excisional debridement  Left shin:  eschar. No s/s of infection. s/p excisional debridement   08/07/2024 Pt here for f/u Accompanied by  patient had recent fall at home, bruises on bilateral shins, no wounds, patient didn't go to ER after the fall, denies pain or decreased range of motion No new complaints as per homecare nurse tunnel improving Not interested in partaking in Altrazeal pressure injury study at this time  On exam: sacral wound: slough to wound edge, tunnel improved 12-2oc- 3.2cm,  superfical wound in periowund healed.  No s/s of infection. s/p excisional debridement Left shin: wound healed No s/s of infection.   9-4-24: Pt here for f/u Accompanied by  No new complaints on George and Ensure and glucerna but as per  not eating much and losing weight Pt also having problems swallowing on occ.  This is recent On exam: sacral wound: periwound callus.  scant slough on edges.  No s/s of infection. s/p excisional debridement Undermining less.  Silver nitrate used for hemostasis.   10-2-24: Pt here for f/u Accompanied by  No new complaints re wound Has nurse 3x/week  Plan for swallow study as per PMD and dental eval.  Hasn't gained weight but hasn't dropped.  Tolerating protein shakes  On exam: sacral wound: periwound maceration.  granulation tissue with epiboly.  s/p silver nitrate to epiboly and area of undermining.  s/p mechanical debridement .    4-30-25: Pt here for f/u Accompanied by  S/p Mutiple hospitalizations.  Here for sacral wound and BLE wounds On exam: sacrum: bone exposed with slough at edges.  undermining present. s/p excisional debridement No s/s of infection.  rt medial distal thigh wound:  necrotic epidermis/dermis with subq exposed. s/p mechanical debridement s/p excisional debridement of epidermis/dermis left lateral proximal leg:  eschar and slough s/p excisional debridement  No s/s of infection.   5-28-25: Pt here for f/u Accompanied by  No new complaints On exam: sacrum:  small amount of bone exposed.  scant slough s/p excisional debridement of muscle.  No s/s of infection.  rt medial distal thigh wound: healed No s/s of infection.  NEW Rt lateral leg wound:  slough. s/p mechanical debridement No s/s of infection.  left lateral proximal leg:  slough and necrotic epidermis.  s/p excisional debridement No s/s of infection.  NEW Rt chest wall--  area with localized erythema, tender and fluctuant vs cystic.  No crepitus:  Needle aspiration performed with 18 gauge needle-- nothing drawn back.

## 2025-05-28 NOTE — REVIEW OF SYSTEMS
[Skin Wound] : skin wound [Negative] : Psychiatric [FreeTextEntry9] : non ambulatory [de-identified] : MS, paraplegia

## 2025-05-28 NOTE — PHYSICAL EXAM
[Ankle Swelling (On Exam)] : present [Ankle Swelling On The Right] : mild [Skin Ulcer] : ulcer [Alert] : alert [Oriented to Person] : oriented to person [Oriented to Place] : oriented to place [Oriented to Time] : oriented to time [Calm] : calm [Please See PDF for Tissue Analytics] : Please See PDF for Tissue Analytics. [de-identified] : NAD [de-identified] : AT [de-identified] : supple [de-identified] : equal chest rise [FreeTextEntry1] : Extremities are warm, , faint dp pulses BLE [de-identified] : velarde [de-identified] : B/L LE muscle wasting [de-identified] : See TA [de-identified] : .

## 2025-06-25 NOTE — HISTORY OF PRESENT ILLNESS
[FreeTextEntry1] : Skinned right leg in 2019 after collapsing on the front steps of her house. Patient ended up in hospital unable to walk due to spinal tumor. Patient had been using Silvadene ointment after the accident, is now only using an adhesive bandage.   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 She went to a  center in [place], had used VAC. After 3 years the wound was down to the size of a pea, 1 more year later it was covered by a scab. In June 2022 Patient went back into the hospital and it reopened and got bigger.   Patient packing sacrum with aquacel and covering with adhesive foam  Patient also stated that between [id number] she had 3 spontaneous tendon ruptures in both her arms, and right calf. 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 She is accompanied by her , who serves as primary caregiver.  They report that her RLE wound has completely healed, but also that the patient feels pruritis at the site and repeatedly scratches at it. To mitigate this, the  places an adhesive foam bandage to the area.  They report that her sacral wound appears stable. Patient denies any fever or chills, denies any purulent drainage or malodor. There is some clear yellow drainage from the sacral site. The  reports that he's been applying the Aquacel to the base of the wound, which he then covers with Michelle.  The  also pointed out some areas of skin discoloration of the R lateral malleolus and B/L upper buttocks/lower sacrum.  The patient sleeps in bed with her . They have offloading cushioning for her SureWaveso wheelchair, but do not have any level of off-loading hospital mattress/bed.  Patient also had a fall roughly one week prior, landing on the back of her LUE and hitting the her forehead as well. She reports soreness and swelling of her LUE, but denies any paresthesia or weakness of her LUE. No loss of consciousness, no nausea/vomiting, no memory loss or unexplained changes in temperament.   10-14-22 Patient presenting for follow-up of Stage IV sacral pressure wound, accompanied by . He's been changing her dressing daily, using Michelle, Aquacel and adhesive foam dressings (which he's been purchasing online). Patient denies any fever or chills, denies any purulent drainage or malodor. Patient denies any pain.  Regarding the question of a Group II mattress, the  and patient are amenable to ordering one at this time. He was also explaining that he's been stretched a bit thin, taking care of his wife on his own and working (as a ) at the same time.   undermine 12-3 o clock @12 - 2cm @ 3- 2.8 cm  10-28-22 Patient presenting for follow-up of Stage IV sacral pressure wound, accompanied by . In the interim, they have gotten VNS (coming M/W/F to apply VAC), and VNS has requested Social Work to evaluate (regarding care burden on ), and Group II mattress approval is in process. They do report some difficulties with the VAC system - it has lost seal multiple times, secondary to patient's incontinence. The original VAC dressing system came with some replacement coverings, which allowed the  to fix the leak - but the new system they are using does not have any supplemental dressings. VNS is looking into getting a different dressing system to address the issue. When the VAC fails, the  has resorted to packing the wound with Michelle, then Aquacel roping and then adhesive gauze.   4-30-35: Pt presents for eval of sacral wound. Pt s/p multiple hospitalizations.  Tx'ed for OM. Pt currently resides in [place].  Pt last seen at [facility] and followed by wound care--Sacral wound tx'ed with a Vac with santyl and BLE wounds

## 2025-06-25 NOTE — PHYSICAL EXAM
[Ankle Swelling (On Exam)] : present [Ankle Swelling On The Right] : mild [Skin Ulcer] : ulcer [Alert] : alert [Oriented to Person] : oriented to person [Oriented to Place] : oriented to place [Oriented to Time] : oriented to time [Calm] : calm [Please See PDF for Tissue Analytics] : Please See PDF for Tissue Analytics. [de-identified] : NAD [de-identified] : AT [de-identified] : supple [de-identified] : equal chest rise [FreeTextEntry1] : Extremities are warm, , faint dp pulses BLE [de-identified] : velarde [de-identified] : B/L LE muscle wasting [de-identified] : See TA [de-identified] : .

## 2025-06-25 NOTE — REVIEW OF SYSTEMS
[Skin Wound] : skin wound [Negative] : Psychiatric [FreeTextEntry9] : non ambulatory [de-identified] : MS, paraplegia

## 2025-06-25 NOTE — PLAN
[FreeTextEntry1] : 7-29-22 wash with soap and water Plan-Christa/aquacel/foam 3x/week Nursing orders given f/u 2-3 weeks  8-24-22: wash with soap and water Plan-Christa/aquacel/foam 3x/week Barrier to BL buttocks F/u 2-3 weeks  9-16-22 - Cleanse with soap and water -  instructed as to the proper order of dressing application (Christa first directly to the wound bed, followed by Aquacel and then adhesive foam - Apply skin moisturizer to RLE to diminish pruritis - Continue dressing changes every other day - Frequent alteration of lying and sitting positions, will defer placing order of hospital mattress at this time given they would like to remain sleeping in same bed. - Follow up in the Wound Care clinic in 3 weeks.   10-14-22 - Christa, Aquacel roping, adhesive gauze bandage to sacral wound daily. - Offload right ankle (no DTI on exam today) - Continue wound cleansing with soap and water - VNS and VAC ordered.  Once Nusring in SW can come in and evaluate for further care/assistance - Home care supplies ordered - Follow up in the Wound Care clinic in 2 weeks.   10-28-22 - Continue Christa, followed by Aquacel roping and adhesive gauze dressing, until VNS returns with wound VAC - Continue offloading of R lateral malleolus - The patient was counseled to cleanse their wound(s) with [employer] unscented baby shampoo or Dove sensitive soap, rinse with clean water and then re-dress.  - Await approval of Group II mattress - Follow up in the Wound Care clinic in 2 weeks.   11-11-22: Plan: cont vac 3x/week.  if wound get stoo small for VAC go back to christa/aquacel/foam 3x/week Nursing orders given f/u 3-4 weeks   1-13-23: Plan: christa/aquacel/foam applied today.  and cont till vac can be placed by RN on Monday Nursing orders given f/u 3-4   2-10-23: Plan: aquacel ag rope/foam dressing 3x/week and prn Nursing orders given f/u 4 weeks   03/24/2023 Plan: irrigate with 1/4 strength dakins christa followed by aquacel ag rope/foam dressing 3x/week and prn Nursing orders given f/u 4 weeks   4/21/23 Plan: Cleanse wound with soap and water Christa/Aquacel rope/Foam Change 3 times a week Nursing orders given Follow up in 3-4 weeks  5-12-12: Plan: Cleanse wound with soap and water Christa/Aquacel rope/Foam,  zinc or cavilon to periwound Change 3 times a week and prn Nursing orders given cont George cont offloading Follow up in 3-4 weeks  6-9-23: Plan: Cleanse wound with soap and water Christa/Aquacel rope/Foam,  zinc or cavilon to periwound Change 3 times a week and prn Nursing orders given cont George cont offloading Follow up in 3-4 weeks  7-26-23: Plan: Cleanse wound with soap and water Christa/Aquacel rope/Foam,  zinc or cavilon to periwound Change 3 times a week and prn NEW MAD: Zinc to buttocks BID Nursing orders given cont George cont offloading Follow up in 4 weeks  9/20/23 Plan: Cleanse wound with soap and water. Mesalt packing/Adhesive Foam,  Zinc or cavilon to periwound Change 3 times a week and prn. Nursing orders given. cont George cont offloading Follow up in 3- 4 weeks  10-18-23: Plan: Cleanse wound with soap and water. Mesalt packing/Adhesive Foam,  Zinc or cavilon to periwound Change 3 times a week and prn. Nursing orders given. cont George cont offloading Follow up in 4 weeks  11/15/23 Plan: Cleanse wound w soap and water Pack with Aquacel Ag rope (not alginate) cavilon to periwound Adhesive foam dressing Zinc barrier cream around outside of dressing Change 3 times a week and prn. Nursing orders given. cont George cont offloading Follow up in 3-4 weeks  12/27/23 Plan - sacral - after flushing, pack with aquacel, cavilon wang wound/foam over offload area adequate nutrition, george coupons and samples given orders for nurse follow up 2 weeks contact primary regarding aspirin  1-7-24: Plan: wash with soap and water drawtex/foam/zinc to periwound.  Nursing orders given f/u cx The patient was counseled as to the signs and symptoms of infection, and instructed in the event they suspect new or worsening infection or if the patient is significantly ill (fever, altered mental status, etc.), to present to the nearest ED.  f/u 3-4 weeks   02/14/2024 Plan: wash with soap and water aquacel/drawtex/foam/zinc to periwound.  Nursing orders given xray of sacrum to be ordered f/u 4 weeks   3-13-2$: Plan: sacrum:  clinically OM based on exam (NEW) wash with soap and water, irrigate with vashe aquacel/drawtex/foam/zinc to periwound.  Nursing orders given will order MRI of sacrum (to be coordinated with other MRI's pt needs). Rx given Rec ID consult.  MD access form with number to call to schedule given f/u 3-4 weeks   4-10-24: Plan: sacrum: wash with soap and water, irrigate with vashe aquacel/drawtex/foam/OTC antifungal (x1 weekto periowund)/zinc to periwound.  Nursing orders given f/u 3-4 weeks   5-8-24: Plan: sacral wound:  wash with soap and water, irrigate with vashe aquacel AG/foam/zinc to periwound.  NEW RT buttock DTI-  offload , zinc if skin opens Nursing orders given cushion to chair at all times f/u 3-4 weeks   6/5/24 Sacral wound: Wash with soap and water, irrigate w vashe Pack with Aquacel AG, adhesive foam. Zinc to periwound L shin hematoma - Cavilon Nursing orders provided Considering topical oxygen therapy f/u in 3-4 weeks  7-10-24: Plan: d/w pt Altrazeal Pressure Injury study.  Consent given for review. Sacral wound: Wash with soap and water, irrigate w vashe Pack with Aquacel AG, adhesive foam. Zinc to periwound Left shin:  honey and foam  Nursing orders given  f/u 3-4 weeks   08/07/2024 Plan: Sacral wound: Wash with soap and water, irrigate w vashe Pack with Aquacel AG, adhesive foam. Zinc to periwound LLE wound HEALED Nursing orders given  f/u 3-4 weeks   9-4-24: Plan: sacral wound: cont current tx Wash with soap and water, irrigate w vashe Pack with Aquacel AG, adhesive foam. Zinc to periwound Nursing orders given Rec nutrition consult.   to speak to PMD.  Also rec f/u for swallowing issues with PMD and Neuro.  Pt as appts next  week.  offload f/u 3-4 weeks   10-2-24: Plan: sacral wound wash with soap and water cont aquacel ag/foam.  zinc to periwound f/u speech and swallow and dentist and PMD Nursing orders given  f/u 3-4 weeks   4-30-25: Plan: sacral wound vashe wet to dry applied today with foam.  at facility wash with soap and water cleanse with vashe or 1/4 strength dakins cont NPWT at NH 3x/week-- white foam to undermining and adaptic over bone BLE: wash with soap and water honey/foam daily Nursing orders given  f/u in 4 weeks   5-28-25: Plan: sacral wound vashe wet to dry applied today with foam.  at facility wash with soap and water cleanse with vashe or 1/4 strength dakins cont NPWT at NH 3x/week-- white foam to undermining and adaptic over bone LLE and NEW Rt  lateral leg wound wash with soap and water honey/foam daily NEW Rt chest wall skin lesion:  wash with soap and water.  warm compresses BID The patient was counseled as to the signs and symptoms of infection, and instructed in the event they suspect new or worsening infection or if the patient is significantly ill (fever, altered mental status, etc.), to present to the nearest ED.  Nursing orders given f/u in 4 weeks    6/25/25 Plan: Sacral wound: Continue wound vac: White foam to the undermining and black foam Left Lateral: Medi Honey/foam Right lateral leg healed New Left Shin  Medi Honey/foam Chest wall lesion resolved-- cavilon Nursing orders given Follow up in 4 weeks

## 2025-06-25 NOTE — ASSESSMENT
[FreeTextEntry1] : Pressure injury of sacral region, stage 4 (707.03,707.24) (L89.154) 7-29-22: Pt accompanied by  Pt has home care in place.  also changes wound dressing Pt here for stage 4 sacral wound which has been taken care of at [facility] and was almost healed but got bigger with recent hospitalization Patient's  reports small amount of drainage on the bandage No drainage from leg On exam: Rt lower leg with scar and hypopigmented tissue evidence of recent epithelialization. No open wound Sacrum: wound with periwound maceration and epiboly with granulation tissue centrally No s/s of infection Chemical cauterization with silver nitrate to sacral wound  8-24-22: Pt here for f/u Accompanied by  who does wound care No new complaints.  has concerns that buttocks breaking down. Pt incontinent On exam: faint hyperpigmentation to BL buttocks sacral wound clean, appears smaller. epiboly present silver nitrate to epiboly No s/s of infection underminig 12:00 - 6:00 1cm, 9:00 0.5cm  9-15-22 Patient here for f/u of sacral wound, accompanied by . - DTI developing in R lateral malleolar and B/L upper buttock/lower sacral regions - limited to blanching lividity at this point. Discussion addressing importance of offloading pressure from these regions was had with the patient and her . Given her sleeping position (supine), the possibility of ordering a Group 2 hospital mattress was discussed, but they will defer for now. Alternatively, patient's  was encouraged to change her position (alternating between lying on her sides) every few hours at least with support of a pillow. - Sacral wound: Good granulation tissue present in wound base, overlying sacral bone (no exposure). No local or systemic signs/symptoms of infection. No purulent discharge, no blanching erythema, no malodor, no crepitus or bullae formation. Excisional debridement of wound slough and non-viable/necrotic tissue was performed to a maximum depth of 1.2 cm into the muscular/fascial layer. Once debrided, the wound base appeared healthy - good granulation tissue present, well vascularized, without residual macroscopic necrotic debris. There was some persistent oozing from the debridement area, and silver nitrate was applied which achieved hemostasis.  10-14-22 Patient here for f/u of sacral wound, accompanied by . - Sacral Stage IV pressure ulcer. Excisional debridement of wound slough and non-viable/necrotic tissue was performed to a maximum depth of 1 cm into the muscular/fascial layer. The rim of calloused skin at the wound edge was sharply debrided as well. Once debrided, the wound base appeared healthy - good granulation tissue present, well vascularized, without residual macroscopic necrotic debris.  Given the increased undermining, likely due to sheering effects, the patient and her  are interested in obtaining a Group II mattress, which will be ordered.  Given the depth and increased undermining, will order a VAC and VNS. Wound is clean and amenable to VAC therapy.  Patient and her  are amenable to reaching out to Social Work to see about arranging help for the home. - R lateral malleolar previously suspected DTI: Site inspected, minimal erythema over malleolus which blanches. No local or systemic signs/symptoms of infection. No purulent discharge, no blanching erythema, no malodor, no crepitus or bullae formation.  10-28-22 Pt presenting for f/u of sacral Stage IV PI, accompanied by her . - Stage IV sacral PI: Wound site without any necrotic debris or slough necessitating excisional debridement. Mechanical debridement performed for scant slough. Base is clean, good granulation tissue, minimal maceration of the immediate wang-wound skin. Patient's  advised that he can use Tegaderms to re-seal the VAC dressing when necessary (samples given), and also that the VNS team should call into the [facility] office for guidance if necessary. Until next VNS visit, current dressing changes should continue. - R lateral malleolus site: No further lividity of the site, only some hyperpigmentation remains. Continue offloading site.  11-11-22: Pt here for f/u Accompanied by  No new complaints Pt has Nurse 3x/week and has PT 3x/week Pt saw SW and deciding on plan and need Pt received group 2 bed yesterday On exam: sacral wound with improvement- less undermining and depth. Periwound maceration. No s/s of infection. s/p excisional debridement of wound rim (SQ)  1-13-23: Pt here for f/u Accompanied by  No new complaints. Pt recently hospitalized for COVID and UTI Still with home crae 3x/week and PT to restart next week On exam: sacral wound: still with undermining. scant slough, s/p excisional debridement  2-10-23: Pt here for f/u Accompanied by  No new complaints Home care 3x/week Pt does not want vac anymore On exam: sacral wound: periwound scant slough. No s/s of infection. s/p excisional debridement   03/24/2023 Pt here for f/u Accompanied by  No new complaints Home care 3x/week Pt with Group 2 mattress - needs for offloading secondary to not being able to self position due to multiple sclerosis Using Prostat at home On exam: sacral wound: periwound scant slough. undermining still present. No s/s of infection. s/p excisional debridement   4/21/23 Patient here for follow up of Sacral Pressure injury Stage 4 accompanied by her  no new complaints Patient is using Juveen as a supplement Patient has a Group 2 mattress and Roho cushion for her wheelchair On exam: Wound improving, wound edges macerated, scant slough, hypergranulation present in the wound bed s/p excisional debridement Cautery with silver nitrate to hypergran Undermining from 12 - 3 pm at 3cm  5-12-23: Pt here for f/u Accompanied by  No new complaints Nurse 3x/week Cont with George On exam: sacral wound: periwound maceration, scant slough, less undermining. no s/s of infection s/p excisional debridement  6-9-23: Pt here for f/u Accompanied by lauren No new complaints Has nurse 3x/week cont george On exam: sacral wound: periwound maceration and scant slough at wound edges with hypergran centrally. s/p excisional debridement and silver nitrate to hypergran   7-26-23: Pt here for f/u Accompanied by  Celina laura right buttock has a developing wound has nurse 3x/week On exam: sacral wound: periwound maceration and scant slough at wound edges with hypergran centrally. s/p excisional debridement and silver nitrate to hypergran NEW Rt buttock: healed blister in setting of MAD  9/20/23 Patient presents accompanied by her  for sacral wound follow up. has home care taking protein supplement On exam: Sacral wound with hypergranulation centrally. s/p excisional debridement No s/s of infection. s/p Silver Nitrate Cautery performed. Washed with soap and water. Apply Mesalt packing. Zinc oxide to periwound Adhesive foam dressing. Change three times a week and as needed by family.  10-18-23: Pt here for f/u Accompanied by  No new complaints On exam:  sacral wound:  scant slough. opening appears smaller. undermining of 4.5 at 3:00  No s/s of infection. s/p excisional debridement    11/15/23 Pt here for f/u of sacral wound. Accompanied by . Has home nursing 3x/wk No new complaints, no fevers. On exam: Sacral wound is small, macerated periwound skin and slough, some hypergranular tissue in tract. Undermines 5cm at 3oclock. Excisional debridement of slough and nonviable skin performed No evidence of infectoin  12/27/23 Patient fell last week in house, went over walker, sustained bruising to left lower abdomen and left leg, area are soft, but does take aspirin daily, advised to call primary and ask if it should be held for few days Sacral wound became small on exterior, difficult to pack, discussed that undermining is present fro 9-11 o'clock  and to facilitate packing wound needs to be opened in that area, pt. consented, area injected, opened, noted to have granuloma with straight depth, visable, treated with silver nitrate, s/p excisional debridement , pt. tolerated well  1-17-24: Pt here for f/u Accompanied by   says wound is draining copious amounts of fluid.  Pt with fever and feeling unwell 1 week ago.  Saw PMD. Elevated WBC.  No source.  Started on cipro.  Today day 7/7 of cipro.  No fevers but still feel unwell.  On exam:  sacral wound: periwound maceration.  depth 0.8cm.  undermining of 5 at 2:00.  scant slough.  serosanguinous drainage.  s/p excisional debridement  Cx taken.   02/14/2024 Pt here for f/u Accompanied by   says wound is still draining fluid.  last cultures positive for staph,  pt completed bactrim On exam:  sacral wound: periwound callused.  depth 0.8cm.  undermining of 6cm at 2:00.  scant slough.  serosanguinous drainage.  s/p excisional debridement  of muscle bone palpable but not exposed.  xray will be ordered to r/o OM  3-13-24: Pt here for f/u Accompanied by  No new complaints xray from 3/3/24: limited but no OM, if concerns recommend further studies has nurse 3x/week  On exam: sacral wound: 1cm deep, 2cm at 12:00, 5cm at 3:00. rough bone exposed within undermining at 2-3:00. slough present. s/p excisional debridement  No s/s of infection.   4-10-24: Pt here for f/u Accompanied by  Saw ID-- no abx necessary MRI pending-- scheduled for this month ([place]) Has nurse 3x/week No new complaints  said redness around wound wasnt there before and could be just from transport On exam: sacral wound: periwound callus with maceration.  periwound blanchable erythema.  No periwound warmth, tenderness, induration, fluctuance or crepitus.  scant slough at wound edges.  s/p excisional debridement  bone no longer exposed in undermining  5-8-24: Pt here for f/u Accompanied by  No new complaints MRI pelvis reviewed. with pt and  Pt just got an IV infusion that lasted 6 hrs in a chair on 5-6-24.  Pt was not using cushion On exam: sacral wound: periowund callus with scant slough at wound sides. No s/s of infection. s/p excisional debridement  NEW Rt buttock DETI.  No open skin. No s/s of infection.   6/5/24 Patient here for follow up, accompanied by . Has home nursing 3x/wk. Has been using Roho cushion. No fevers On exam: NEW Left shin injury - appears to have an old blood blister over a deeper resolving hematoma. Outer layer removed via mechanical debridement. No surrounding erythema or signs of infection Sacral wound: periwound callus with moderate drainage, scant slough at wound edges. No evidence of infection. Excisional debridement performed  7-10-24: Pt here for f/u Accompanied by  No new complaints Does to want to consider EO2 at this time due to other medical conditions complicating things Incontinent of stool, continent of urine On exam: sacral wound: slough to wound edges and superfical wound in periowund.  No s/s of infection. s/p excisional debridement  Left shin:  eschar. No s/s of infection. s/p excisional debridement   08/07/2024 Pt here for f/u Accompanied by  patient had recent fall at home, bruises on bilateral shins, no wounds, patient didn't go to ER after the fall, denies pain or decreased range of motion No new complaints as per homecare nurse tunnel improving Not interested in partaking in Altrazeal pressure injury study at this time  On exam: sacral wound: slough to wound edge, tunnel improved 12-2oc- 3.2cm,  superfical wound in periowund healed.  No s/s of infection. s/p excisional debridement Left shin: wound healed No s/s of infection.   9-4-24: Pt here for f/u Accompanied by  No new complaints on George and Ensure and glucerna but as per  not eating much and losing weight Pt also having problems swallowing on occ.  This is recent On exam: sacral wound: periwound callus.  scant slough on edges.  No s/s of infection. s/p excisional debridement Undermining less.  Silver nitrate used for hemostasis.   10-2-24: Pt here for f/u Accompanied by  No new complaints re wound Has nurse 3x/week  Plan for swallow study as per PMD and dental eval.  Hasn't gained weight but hasn't dropped.  Tolerating protein shakes  On exam: sacral wound: periwound maceration.  granulation tissue with epiboly.  s/p silver nitrate to epiboly and area of undermining.  s/p mechanical debridement .    4-30-25: Pt here for f/u Accompanied by  S/p Mutiple hospitalizations.  Here for sacral wound and BLE wounds On exam: sacrum: bone exposed with slough at edges.  undermining present. s/p excisional debridement No s/s of infection.  rt medial distal thigh wound:  necrotic epidermis/dermis with subq exposed. s/p mechanical debridement s/p excisional debridement of epidermis/dermis left lateral proximal leg:  eschar and slough s/p excisional debridement  No s/s of infection.   5-28-25: Pt here for f/u Accompanied by  No new complaints On exam: sacrum:  small amount of bone exposed.  scant slough s/p excisional debridement of muscle.  No s/s of infection.  rt medial distal thigh wound: healed No s/s of infection.  NEW Rt lateral leg wound:  slough. s/p mechanical debridement No s/s of infection.  left lateral proximal leg:  slough and necrotic epidermis.  s/p excisional debridement No s/s of infection.  NEW Rt chest wall--  area with localized erythema, tender and fluctuant vs cystic.  No crepitus:  Needle aspiration performed with 18 gauge needle-- nothing drawn back.     6/25/25 Patient presents via stretcher for follow up of sacral wound, lateral left knee and left shin. Patient accompanied by her  and HHA. Patient completed course of antibiotics Patient has a indwelling Cole On exam: Sacral wound: 1cm SD Undermining 9-3 o'clock greatest at 12 - 3.5 cm no bone exposed s/p mechanical debridement No s/s of infection. Continue wound vac: White foam to the undermining and black foam Left Lateral leg Wound bed covered in yellow slough s/p excisional debridement Periwound intact No s/s of infection. Medi Honey/foam Right lateral leg healed New Left Shin  Wound covered in dry yellow slough s/p excisional debridement No s/s of infection. medi Honey/foam Chest wall lesion resolved-- erythematous flat area w/o  warmth, tenderness, induration, fluctuance or crepitus.

## 2025-07-23 NOTE — PHYSICAL EXAM
[Ankle Swelling (On Exam)] : present [Ankle Swelling On The Right] : mild [Skin Ulcer] : ulcer [Alert] : alert [Oriented to Person] : oriented to person [Oriented to Place] : oriented to place [Oriented to Time] : oriented to time [Calm] : calm [Please See PDF for Tissue Analytics] : Please See PDF for Tissue Analytics. [de-identified] : NAD [de-identified] : AT [de-identified] : supple [de-identified] : equal chest rise [FreeTextEntry1] : Extremities are warm, , faint dp pulses BLE [de-identified] : velarde [de-identified] : B/L LE muscle wasting [de-identified] : See TA [de-identified] : .

## 2025-07-23 NOTE — HISTORY OF PRESENT ILLNESS
[FreeTextEntry1] : Skinned right leg in 2019 after collapsing on the front steps of her house. Patient ended up in hospital unable to walk due to spinal tumor. Patient had been using Silvadene ointment after the accident, is now only using an adhesive bandage.   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 She went to a  center in [place], had used VAC. After 3 years the wound was down to the size of a pea, 1 more year later it was covered by a scab. In June 2022 Patient went back into the hospital and it reopened and got bigger.   Patient packing sacrum with aquacel and covering with adhesive foam  Patient also stated that between [id number] she had 3 spontaneous tendon ruptures in both her arms, and right calf. 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 She is accompanied by her , who serves as primary caregiver.  They report that her RLE wound has completely healed, but also that the patient feels pruritis at the site and repeatedly scratches at it. To mitigate this, the  places an adhesive foam bandage to the area.  They report that her sacral wound appears stable. Patient denies any fever or chills, denies any purulent drainage or malodor. There is some clear yellow drainage from the sacral site. The  reports that he's been applying the Aquacel to the base of the wound, which he then covers with Michelle.  The  also pointed out some areas of skin discoloration of the R lateral malleolus and B/L upper buttocks/lower sacrum.  The patient sleeps in bed with her . They have offloading cushioning for her [employer] wheelchair, but do not have any level of off-loading hospital mattress/bed.  Patient also had a fall roughly one week prior, landing on the back of her LUE and hitting the her forehead as well. She reports soreness and swelling of her LUE, but denies any paresthesia or weakness of her LUE. No loss of consciousness, no nausea/vomiting, no memory loss or unexplained changes in temperament.   10-14-22 Patient presenting for follow-up of Stage IV sacral pressure wound, accompanied by . He's been changing her dressing daily, using Michelle, Aquacel and adhesive foam dressings (which he's been purchasing online). Patient denies any fever or chills, denies any purulent drainage or malodor. Patient denies any pain.  Regarding the question of a Group II mattress, the  and patient are amenable to ordering one at this time. He was also explaining that he's been stretched a bit thin, taking care of his wife on his own and working (as a ) at the same time.   undermine 12-3 o clock @12 - 2cm @ 3- 2.8 cm  10-28-22 Patient presenting for follow-up of Stage IV sacral pressure wound, accompanied by . In the interim, they have gotten VNS (coming M/W/F to apply VAC), and VNS has requested Social Work to evaluate (regarding care burden on ), and Group II mattress approval is in process. They do report some difficulties with the VAC system - it has lost seal multiple times, secondary to patient's incontinence. The original VAC dressing system came with some replacement coverings, which allowed the  to fix the leak - but the new system they are using does not have any supplemental dressings. VNS is looking into getting a different dressing system to address the issue. When the VAC fails, the  has resorted to packing the wound with Michelle, then Aquacel roping and then adhesive gauze.   4-30-35: Pt presents for eval of sacral wound. Pt s/p multiple hospitalizations.  Tx'ed for OM. Pt currently resides in NH.  Pt last seen at [facility] and followed by wound care--Sacral wound tx'ed with a Vac with santyl and BLE wounds

## 2025-07-23 NOTE — ASSESSMENT
[FreeTextEntry1] : Pressure injury of sacral region, stage 4 (707.03,707.24) (L89.154) 7-29-22: Pt accompanied by  Pt has home care in place.  also changes wound dressing Pt here for stage 4 sacral wound which has been taken care of at [facility] and was almost healed but got bigger with recent hospitalization Patient's  reports small amount of drainage on the bandage No drainage from leg On exam: Rt lower leg with scar and hypopigmented tissue evidence of recent epithelialization. No open wound Sacrum: wound with periwound maceration and epiboly with granulation tissue centrally No s/s of infection Chemical cauterization with silver nitrate to sacral wound  8-24-22: Pt here for f/u Accompanied by  who does wound care No new complaints.  has concerns that buttocks breaking down. Pt incontinent On exam: faint hyperpigmentation to BL buttocks sacral wound clean, appears smaller. epiboly present silver nitrate to epiboly No s/s of infection underminig 12:00 - 6:00 1cm, 9:00 0.5cm  9-15-22 Patient here for f/u of sacral wound, accompanied by . - DTI developing in R lateral malleolar and B/L upper buttock/lower sacral regions - limited to blanching lividity at this point. Discussion addressing importance of offloading pressure from these regions was had with the patient and her . Given her sleeping position (supine), the possibility of ordering a Group 2 hospital mattress was discussed, but they will defer for now. Alternatively, patient's  was encouraged to change her position (alternating between lying on her sides) every few hours at least with support of a pillow. - Sacral wound: Good granulation tissue present in wound base, overlying sacral bone (no exposure). No local or systemic signs/symptoms of infection. No purulent discharge, no blanching erythema, no malodor, no crepitus or bullae formation. Excisional debridement of wound slough and non-viable/necrotic tissue was performed to a maximum depth of 1.2 cm into the muscular/fascial layer. Once debrided, the wound base appeared healthy - good granulation tissue present, well vascularized, without residual macroscopic necrotic debris. There was some persistent oozing from the debridement area, and silver nitrate was applied which achieved hemostasis.  10-14-22 Patient here for f/u of sacral wound, accompanied by . - Sacral Stage IV pressure ulcer. Excisional debridement of wound slough and non-viable/necrotic tissue was performed to a maximum depth of 1 cm into the muscular/fascial layer. The rim of calloused skin at the wound edge was sharply debrided as well. Once debrided, the wound base appeared healthy - good granulation tissue present, well vascularized, without residual macroscopic necrotic debris.  Given the increased undermining, likely due to sheering effects, the patient and her  are interested in obtaining a Group II mattress, which will be ordered.  Given the depth and increased undermining, will order a VAC and VNS. Wound is clean and amenable to VAC therapy.  Patient and her  are amenable to reaching out to Social Work to see about arranging help for the home. - R lateral malleolar previously suspected DTI: Site inspected, minimal erythema over malleolus which blanches. No local or systemic signs/symptoms of infection. No purulent discharge, no blanching erythema, no malodor, no crepitus or bullae formation.  10-28-22 Pt presenting for f/u of sacral Stage IV PI, accompanied by her . - Stage IV sacral PI: Wound site without any necrotic debris or slough necessitating excisional debridement. Mechanical debridement performed for scant slough. Base is clean, good granulation tissue, minimal maceration of the immediate wang-wound skin. Patient's  advised that he can use Tegaderms to re-seal the VAC dressing when necessary (samples given), and also that the VNS team should call into the [facility] office for guidance if necessary. Until next VNS visit, current dressing changes should continue. - R lateral malleolus site: No further lividity of the site, only some hyperpigmentation remains. Continue offloading site.  11-11-22: Pt here for f/u Accompanied by  No new complaints Pt has Nurse 3x/week and has PT 3x/week Pt saw SW and deciding on plan and need Pt received group 2 bed yesterday On exam: sacral wound with improvement- less undermining and depth. Periwound maceration. No s/s of infection. s/p excisional debridement of wound rim (SQ)  1-13-23: Pt here for f/u Accompanied by  No new complaints. Pt recently hospitalized for COVID and UTI Still with home crae 3x/week and PT to restart next week On exam: sacral wound: still with undermining. scant slough, s/p excisional debridement  2-10-23: Pt here for f/u Accompanied by  No new complaints Home care 3x/week Pt does not want vac anymore On exam: sacral wound: periwound scant slough. No s/s of infection. s/p excisional debridement   03/24/2023 Pt here for f/u Accompanied by  No new complaints Home care 3x/week Pt with Group 2 mattress - needs for offloading secondary to not being able to self position due to multiple sclerosis Using Prostat at home On exam: sacral wound: periwound scant slough. undermining still present. No s/s of infection. s/p excisional debridement   4/21/23 Patient here for follow up of Sacral Pressure injury Stage 4 accompanied by her  no new complaints Patient is using Juveen as a supplement Patient has a Group 2 mattress and Roho cushion for her wheelchair On exam: Wound improving, wound edges macerated, scant slough, hypergranulation present in the wound bed s/p excisional debridement Cautery with silver nitrate to hypergran Undermining from 12 - 3 pm at 3cm  5-12-23: Pt here for f/u Accompanied by  No new complaints Nurse 3x/week Cont with George On exam: sacral wound: periwound maceration, scant slough, less undermining. no s/s of infection s/p excisional debridement  6-9-23: Pt here for f/u Accompanied by lauren No new complaints Has nurse 3x/week cont george On exam: sacral wound: periwound maceration and scant slough at wound edges with hypergran centrally. s/p excisional debridement and silver nitrate to hypergran   7-26-23: Pt here for f/u Accompanied by  Celina laura right buttock has a developing wound has nurse 3x/week On exam: sacral wound: periwound maceration and scant slough at wound edges with hypergran centrally. s/p excisional debridement and silver nitrate to hypergran NEW Rt buttock: healed blister in setting of MAD  9/20/23 Patient presents accompanied by her  for sacral wound follow up. has home care taking protein supplement On exam: Sacral wound with hypergranulation centrally. s/p excisional debridement No s/s of infection. s/p Silver Nitrate Cautery performed. Washed with soap and water. Apply Mesalt packing. Zinc oxide to periwound Adhesive foam dressing. Change three times a week and as needed by family.  10-18-23: Pt here for f/u Accompanied by  No new complaints On exam:  sacral wound:  scant slough. opening appears smaller. undermining of 4.5 at 3:00  No s/s of infection. s/p excisional debridement    11/15/23 Pt here for f/u of sacral wound. Accompanied by . Has home nursing 3x/wk No new complaints, no fevers. On exam: Sacral wound is small, macerated periwound skin and slough, some hypergranular tissue in tract. Undermines 5cm at 3oclock. Excisional debridement of slough and nonviable skin performed No evidence of infectoin  12/27/23 Patient fell last week in house, went over walker, sustained bruising to left lower abdomen and left leg, area are soft, but does take aspirin daily, advised to call primary and ask if it should be held for few days Sacral wound became small on exterior, difficult to pack, discussed that undermining is present fro 9-11 o'clock  and to facilitate packing wound needs to be opened in that area, pt. consented, area injected, opened, noted to have granuloma with straight depth, visable, treated with silver nitrate, s/p excisional debridement , pt. tolerated well  1-17-24: Pt here for f/u Accompanied by   says wound is draining copious amounts of fluid.  Pt with fever and feeling unwell 1 week ago.  Saw PMD. Elevated WBC.  No source.  Started on cipro.  Today day 7/7 of cipro.  No fevers but still feel unwell.  On exam:  sacral wound: periwound maceration.  depth 0.8cm.  undermining of 5 at 2:00.  scant slough.  serosanguinous drainage.  s/p excisional debridement  Cx taken.   02/14/2024 Pt here for f/u Accompanied by   says wound is still draining fluid.  last cultures positive for staph,  pt completed bactrim On exam:  sacral wound: periwound callused.  depth 0.8cm.  undermining of 6cm at 2:00.  scant slough.  serosanguinous drainage.  s/p excisional debridement  of muscle bone palpable but not exposed.  xray will be ordered to r/o OM  3-13-24: Pt here for f/u Accompanied by  No new complaints xray from 3/3/24: limited but no OM, if concerns recommend further studies has nurse 3x/week  On exam: sacral wound: 1cm deep, 2cm at 12:00, 5cm at 3:00. rough bone exposed within undermining at 2-3:00. slough present. s/p excisional debridement  No s/s of infection.   4-10-24: Pt here for f/u Accompanied by  Saw ID-- no abx necessary MRI pending-- scheduled for this month ([place]) Has nurse 3x/week No new complaints  said redness around wound wasnt there before and could be just from transport On exam: sacral wound: periwound callus with maceration.  periwound blanchable erythema.  No periwound warmth, tenderness, induration, fluctuance or crepitus.  scant slough at wound edges.  s/p excisional debridement  bone no longer exposed in undermining  5-8-24: Pt here for f/u Accompanied by  No new complaints MRI pelvis reviewed. with pt and  Pt just got an IV infusion that lasted 6 hrs in a chair on 5-6-24.  Pt was not using cushion On exam: sacral wound: periowund callus with scant slough at wound sides. No s/s of infection. s/p excisional debridement  NEW Rt buttock DETI.  No open skin. No s/s of infection.   6/5/24 Patient here for follow up, accompanied by . Has home nursing 3x/wk. Has been using Roho cushion. No fevers On exam: NEW Left shin injury - appears to have an old blood blister over a deeper resolving hematoma. Outer layer removed via mechanical debridement. No surrounding erythema or signs of infection Sacral wound: periwound callus with moderate drainage, scant slough at wound edges. No evidence of infection. Excisional debridement performed  7-10-24: Pt here for f/u Accompanied by  No new complaints Does to want to consider EO2 at this time due to other medical conditions complicating things Incontinent of stool, continent of urine On exam: sacral wound: slough to wound edges and superfical wound in periowund.  No s/s of infection. s/p excisional debridement  Left shin:  eschar. No s/s of infection. s/p excisional debridement   08/07/2024 Pt here for f/u Accompanied by  patient had recent fall at home, bruises on bilateral shins, no wounds, patient didn't go to ER after the fall, denies pain or decreased range of motion No new complaints as per homecare nurse tunnel improving Not interested in partaking in Altrazeal pressure injury study at this time  On exam: sacral wound: slough to wound edge, tunnel improved 12-2oc- 3.2cm,  superfical wound in periowund healed.  No s/s of infection. s/p excisional debridement Left shin: wound healed No s/s of infection.   9-4-24: Pt here for f/u Accompanied by  No new complaints on George and Ensure and glucerna but as per  not eating much and losing weight Pt also having problems swallowing on occ.  This is recent On exam: sacral wound: periwound callus.  scant slough on edges.  No s/s of infection. s/p excisional debridement Undermining less.  Silver nitrate used for hemostasis.   10-2-24: Pt here for f/u Accompanied by  No new complaints re wound Has nurse 3x/week  Plan for swallow study as per PMD and dental eval.  Hasn't gained weight but hasn't dropped.  Tolerating protein shakes  On exam: sacral wound: periwound maceration.  granulation tissue with epiboly.  s/p silver nitrate to epiboly and area of undermining.  s/p mechanical debridement .    4-30-25: Pt here for f/u Accompanied by  S/p Mutiple hospitalizations.  Here for sacral wound and BLE wounds On exam: sacrum: bone exposed with slough at edges.  undermining present. s/p excisional debridement No s/s of infection.  rt medial distal thigh wound:  necrotic epidermis/dermis with subq exposed. s/p mechanical debridement s/p excisional debridement of epidermis/dermis left lateral proximal leg:  eschar and slough s/p excisional debridement  No s/s of infection.   5-28-25: Pt here for f/u Accompanied by  No new complaints On exam: sacrum:  small amount of bone exposed.  scant slough s/p excisional debridement of muscle.  No s/s of infection.  rt medial distal thigh wound: healed No s/s of infection.  NEW Rt lateral leg wound:  slough. s/p mechanical debridement No s/s of infection.  left lateral proximal leg:  slough and necrotic epidermis.  s/p excisional debridement No s/s of infection.  NEW Rt chest wall--  area with localized erythema, tender and fluctuant vs cystic.  No crepitus:  Needle aspiration performed with 18 gauge needle-- nothing drawn back.     6/25/25 Patient presents via stretcher for follow up of sacral wound, lateral left knee and left shin. Patient accompanied by her  and HHA. Patient completed course of antibiotics Patient has a indwelling Cole On exam: Sacral wound: 1cm SD Undermining 9-3 o'clock greatest at 12 - 3.5 cm no bone exposed s/p mechanical debridement No s/s of infection. Continue wound vac: White foam to the undermining and black foam Left Lateral leg Wound bed covered in yellow slough s/p excisional debridement Periwound intact No s/s of infection. Medi Honey/foam Right lateral leg healed New Left Shin  Wound covered in dry yellow slough s/p excisional debridement No s/s of infection. medi Honey/foam Chest wall lesion resolved-- erythematous flat area w/o  warmth, tenderness, induration, fluctuance or crepitus.  7-23-25: Pt here for f/u Accompanied by  and HHA No new complaints as per  facility is monitoring BP which has been slightly high.  Plan is more meds at facility and to f/u with her cardiologist as per  On abx for UTI as per  On exam: sacral wound: small area of bone exposed. scant slough.  still with undermining. s/p mechanical debridement No s/s of infection. left lateral leg:  scant slough.  s/p excisional debridement .ssgn left shin: dry scab s/p mechanical debridement revealing healed wound  No s/s of infection.

## 2025-07-23 NOTE — PLAN
[FreeTextEntry1] : 7-29-22 wash with soap and water Plan-Christa/aquacel/foam 3x/week Nursing orders given f/u 2-3 weeks  8-24-22: wash with soap and water Plan-Christa/aquacel/foam 3x/week Barrier to BL buttocks F/u 2-3 weeks  9-16-22 - Cleanse with soap and water -  instructed as to the proper order of dressing application (Christa first directly to the wound bed, followed by Aquacel and then adhesive foam - Apply skin moisturizer to RLE to diminish pruritis - Continue dressing changes every other day - Frequent alteration of lying and sitting positions, will defer placing order of hospital mattress at this time given they would like to remain sleeping in same bed. - Follow up in the Wound Care clinic in 3 weeks.   10-14-22 - Christa, Aquacel roping, adhesive gauze bandage to sacral wound daily. - Offload right ankle (no DTI on exam today) - Continue wound cleansing with soap and water - VNS and VAC ordered.  Once Nusring in SW can come in and evaluate for further care/assistance - Home care supplies ordered - Follow up in the Wound Care clinic in 2 weeks.   10-28-22 - Continue Christa, followed by Aquacel roping and adhesive gauze dressing, until VNS returns with wound VAC - Continue offloading of R lateral malleolus - The patient was counseled to cleanse their wound(s) with [employer] unscented baby shampoo or Dove sensitive soap, rinse with clean water and then re-dress.  - Await approval of Group II mattress - Follow up in the Wound Care clinic in 2 weeks.   11-11-22: Plan: cont vac 3x/week.  if wound get stoo small for VAC go back to christa/aquacel/foam 3x/week Nursing orders given f/u 3-4 weeks   1-13-23: Plan: christa/aquacel/foam applied today.  and cont till vac can be placed by RN on Monday Nursing orders given f/u 3-4   2-10-23: Plan: aquacel ag rope/foam dressing 3x/week and prn Nursing orders given f/u 4 weeks   03/24/2023 Plan: irrigate with 1/4 strength dakins christa followed by aquacel ag rope/foam dressing 3x/week and prn Nursing orders given f/u 4 weeks   4/21/23 Plan: Cleanse wound with soap and water Christa/Aquacel rope/Foam Change 3 times a week Nursing orders given Follow up in 3-4 weeks  5-12-12: Plan: Cleanse wound with soap and water Christa/Aquacel rope/Foam,  zinc or cavilon to periwound Change 3 times a week and prn Nursing orders given cont George cont offloading Follow up in 3-4 weeks  6-9-23: Plan: Cleanse wound with soap and water Christa/Aquacel rope/Foam,  zinc or cavilon to periwound Change 3 times a week and prn Nursing orders given cont George cont offloading Follow up in 3-4 weeks  7-26-23: Plan: Cleanse wound with soap and water Christa/Aquacel rope/Foam,  zinc or cavilon to periwound Change 3 times a week and prn NEW MAD: Zinc to buttocks BID Nursing orders given cont George cont offloading Follow up in 4 weeks  9/20/23 Plan: Cleanse wound with soap and water. [place] packing/Adhesive Foam,  Zinc or cavilon to periwound Change 3 times a week and prn. Nursing orders given. cont George cont offloading Follow up in 3- 4 weeks  10-18-23: Plan: Cleanse wound with soap and water. [place] packing/Adhesive Foam,  Zinc or cavilon to periwound Change 3 times a week and prn. Nursing orders given. cont George cont offloading Follow up in 4 weeks  11/15/23 Plan: Cleanse wound w soap and water Pack with Aquacel Ag rope (not alginate) cavilon to periwound Adhesive foam dressing Zinc barrier cream around outside of dressing Change 3 times a week and prn. Nursing orders given. cont George cont offloading Follow up in 3-4 weeks  12/27/23 Plan - sacral - after flushing, pack with aquacel, cavilon wang wound/foam over offload area adequate nutrition, george coupons and samples given orders for nurse follow up 2 weeks contact primary regarding aspirin  1-7-24: Plan: wash with soap and water drawtex/foam/zinc to periwound.  Nursing orders given f/u cx The patient was counseled as to the signs and symptoms of infection, and instructed in the event they suspect new or worsening infection or if the patient is significantly ill (fever, altered mental status, etc.), to present to the nearest ED.  f/u 3-4 weeks   02/14/2024 Plan: wash with soap and water aquacel/drawtex/foam/zinc to periwound.  Nursing orders given xray of sacrum to be ordered f/u 4 weeks   3-13-2$: Plan: sacrum:  clinically OM based on exam (NEW) wash with soap and water, irrigate with vashe aquacel/drawtex/foam/zinc to periwound.  Nursing orders given will order MRI of sacrum (to be coordinated with other MRI's pt needs). Rx given Rec ID consult.  MD access form with number to call to schedule given f/u 3-4 weeks   4-10-24: Plan: sacrum: wash with soap and water, irrigate with vashe aquacel/drawtex/foam/OTC antifungal (x1 weekto periowund)/zinc to periwound.  Nursing orders given f/u 3-4 weeks   5-8-24: Plan: sacral wound:  wash with soap and water, irrigate with vashe aquacel AG/foam/zinc to periwound.  NEW RT buttock DTI-  offload , zinc if skin opens Nursing orders given cushion to chair at all times f/u 3-4 weeks   6/5/24 Sacral wound: Wash with soap and water, irrigate w vashe Pack with Aquacel AG, adhesive foam. Zinc to periwound L shin hematoma - Cavilon Nursing orders provided Considering topical oxygen therapy f/u in 3-4 weeks  7-10-24: Plan: d/w pt Altrazeal Pressure Injury study.  Consent given for review. Sacral wound: Wash with soap and water, irrigate w vashe Pack with Aquacel AG, adhesive foam. Zinc to periwound Left shin:  honey and foam  Nursing orders given  f/u 3-4 weeks   08/07/2024 Plan: Sacral wound: Wash with soap and water, irrigate w vashe Pack with Aquacel AG, adhesive foam. Zinc to periwound LLE wound HEALED Nursing orders given  f/u 3-4 weeks   9-4-24: Plan: sacral wound: cont current tx Wash with soap and water, irrigate w vashe Pack with Aquacel AG, adhesive foam. Zinc to periwound Nursing orders given Rec nutrition consult.   to speak to PMD.  Also rec f/u for swallowing issues with PMD and Neuro.  Pt as appts next  week.  offload f/u 3-4 weeks   10-2-24: Plan: sacral wound wash with soap and water cont aquacel ag/foam.  zinc to periwound f/u speech and swallow and dentist and PMD Nursing orders given  f/u 3-4 weeks   4-30-25: Plan: sacral wound vashe wet to dry applied today with foam.  at facility wash with soap and water cleanse with vashe or 1/4 strength dakins cont NPWT at NH 3x/week-- white foam to undermining and adaptic over bone BLE: wash with soap and water honey/foam daily Nursing orders given  f/u in 4 weeks   5-28-25: Plan: sacral wound vashe wet to dry applied today with foam.  at facility wash with soap and water cleanse with vashe or 1/4 strength dakins cont NPWT at NH 3x/week-- white foam to undermining and adaptic over bone LLE and NEW Rt  lateral leg wound wash with soap and water honey/foam daily NEW Rt chest wall skin lesion:  wash with soap and water.  warm compresses BID The patient was counseled as to the signs and symptoms of infection, and instructed in the event they suspect new or worsening infection or if the patient is significantly ill (fever, altered mental status, etc.), to present to the nearest ED.  Nursing orders given f/u in 4 weeks    6/25/25 Plan: Sacral wound: Continue wound vac: White foam to the undermining and black foam Left Lateral: Medi Honey/foam Right lateral leg healed New Left Shin  Medi Honey/foam Chest wall lesion resolved-- cavilon Nursing orders given Follow up in 4 weeks  7-23-25 Plan: wash with soap and water sacral wound:  cont NPWT cont at 12mmHg with adaptic over bone with white foam to undermining default dressing NS wet to dry  left lateral leg: honey/foam left shin: healed Nursing orders given f/u 4-6 weeks